# Patient Record
Sex: MALE | NOT HISPANIC OR LATINO | Employment: UNEMPLOYED | URBAN - METROPOLITAN AREA
[De-identification: names, ages, dates, MRNs, and addresses within clinical notes are randomized per-mention and may not be internally consistent; named-entity substitution may affect disease eponyms.]

---

## 2017-01-12 ENCOUNTER — OFFICE VISIT (OUTPATIENT)
Dept: INTERNAL MEDICINE | Facility: MEDICAL CENTER | Age: 27
End: 2017-01-12
Payer: MEDICAID

## 2017-01-12 VITALS
BODY MASS INDEX: 41.01 KG/M2 | OXYGEN SATURATION: 94 % | DIASTOLIC BLOOD PRESSURE: 85 MMHG | HEART RATE: 85 BPM | TEMPERATURE: 98.4 F | WEIGHT: 309.4 LBS | SYSTOLIC BLOOD PRESSURE: 134 MMHG | HEIGHT: 73 IN

## 2017-01-12 DIAGNOSIS — M54.50 CHRONIC MIDLINE LOW BACK PAIN WITHOUT SCIATICA: ICD-10-CM

## 2017-01-12 DIAGNOSIS — M72.2 PLANTAR FASCIITIS, BILATERAL: ICD-10-CM

## 2017-01-12 DIAGNOSIS — M79.671 RIGHT FOOT PAIN: ICD-10-CM

## 2017-01-12 DIAGNOSIS — G89.29 CHRONIC MIDLINE LOW BACK PAIN WITHOUT SCIATICA: ICD-10-CM

## 2017-01-12 DIAGNOSIS — E66.01 MORBID OBESITY WITH BMI OF 40.0-44.9, ADULT (HCC): ICD-10-CM

## 2017-01-12 DIAGNOSIS — Z87.898 HISTORY OF SNORING: ICD-10-CM

## 2017-01-12 DIAGNOSIS — Z00.00 PREVENTATIVE HEALTH CARE: ICD-10-CM

## 2017-01-12 DIAGNOSIS — E66.01 MORBID OBESITY DUE TO EXCESS CALORIES (HCC): ICD-10-CM

## 2017-01-12 PROCEDURE — 90471 IMMUNIZATION ADMIN: CPT | Mod: GC | Performed by: INTERNAL MEDICINE

## 2017-01-12 PROCEDURE — 90715 TDAP VACCINE 7 YRS/> IM: CPT | Mod: GC | Performed by: INTERNAL MEDICINE

## 2017-01-12 PROCEDURE — 99204 OFFICE O/P NEW MOD 45 MIN: CPT | Mod: 25,GC | Performed by: INTERNAL MEDICINE

## 2017-01-12 RX ORDER — ACETAMINOPHEN 500 MG
500-1000 TABLET ORAL
Qty: 90 TAB | Refills: 3 | Status: SHIPPED | OUTPATIENT
Start: 2017-01-12 | End: 2021-03-08

## 2017-01-12 ASSESSMENT — PATIENT HEALTH QUESTIONNAIRE - PHQ9: CLINICAL INTERPRETATION OF PHQ2 SCORE: 0

## 2017-01-12 NOTE — MR AVS SNAPSHOT
"        Anival Brooks   2017 2:45 PM   Office Visit   MRN: [id number]    Department:  [facility] - Internal Med   Dept Phone:  [phone]    Description:  Male : 1990   Provider:  Lisa Mancini M.D.           Reason for Visit     Establish Care     Ankle Pain Right       Allergies as of 2017     No Known Allergies      You were diagnosed with     Plantar fasciitis, bilateral   [398093]       Chronic midline low back pain without sciatica   [0873130]       Right foot pain   [766275]       History of snoring   [9092988]       Morbid obesity due to excess calories (HCC)   [0982646]       Preventative health care   [964521]         Vital Signs     Blood Pressure Pulse Temperature Height Weight Body Mass Index    134/85 mmHg 85 36.9 °C (98.4 °F) 1.842 m (6' 0.52\") 140.343 kg (309 lb 6.4 oz) 41.36 kg/m2    Oxygen Saturation Smoking Status                94% Never Smoker           Basic Information     Date Of Birth Sex Race Ethnicity Preferred Language    1990 Male White Non- English      Your appointments     Mar 22, 2017 10:00 AM   Established Patient with Lisa Mancini M.D.   [facility] - Internal Medicine (--)    [address] [place] [address] [id number] [phone]           You will be receiving a confirmation call a few days before your appointment from our automated call confirmation system.              Problem List              ICD-10-CM Priority Class Noted - Resolved    Plantar fasciitis, bilateral M72.2   2017 - Present    Chronic midline low back pain without sciatica M54.5, G89.29   2017 - Present    Right foot pain M79.671   2017 - Present    Preventative health care Z00.00   2017 - Present    History of snoring Z87.898   2017 - Present    Morbid obesity due to excess calories (HCC) E66.01   2017 - Present      Health Maintenance     Patient has no pending health maintenance at this time      Current " Immunizations     Dtap Vaccine 8/26/1993, 8/20/1990, 4/24/1989, 2/10/1989, 1990    Hepatitis A Vaccine, Ped/Adol 8/10/2005, 7/28/2004    Hepatitis B Vaccine Non-Recombivax (Ped/Adol) 3/15/2004, 4/28/2003, 8/26/1993    IPV 3/15/2004, 8/26/1993, 1990    MMR Vaccine 8/26/1993, 8/20/1990    TD Vaccine 4/28/2003    Tdap Vaccine 1/12/2017      Below and/or attached are the medications your provider expects you to take. Review all of your home medications and newly ordered medications with your provider and/or pharmacist. Follow medication instructions as directed by your provider and/or pharmacist. Please keep your medication list with you and share with your provider. Update the information when medications are discontinued, doses are changed, or new medications (including over-the-counter products) are added; and carry medication information at all times in the event of emergency situations     Allergies:  No Known Allergies          Medications  Valid as of: January 12, 2017 -  3:54 PM    Generic Name Brand Name Tablet Size Instructions for use    Acetaminophen (Tab) TYLENOL 500 MG Take 1-2 Tabs by mouth 1 time daily as needed for Mild Pain or Moderate Pain.        .                 Medicines prescribed today were sent to:     [facility] [address] [place] [id number]    Phone: [phone] Fax: [phone]    Open 24 Hours?: No      Medication refill instructions:       If your prescription bottle indicates you have medication refills left, it is not necessary to call your provider’s office. Please contact your pharmacy and they will refill your medication.    If your prescription bottle indicates you do not have any refills left, you may request refills at any time through one of the following ways: The online [employer] system (except Urgent Care), by calling your provider’s office, or by asking your pharmacy to contact your provider’s office with  a refill request. Medication refills are processed only during regular business hours and may not be available until the next business day. Your provider may request additional information or to have a follow-up visit with you prior to refilling your medication.   *Please Note: Medication refills are assigned a new Rx number when refilled electronically. Your pharmacy may indicate that no refills were authorized even though a new prescription for the same medication is available at the pharmacy. Please request the medicine by name with the pharmacy before contacting your provider for a refill.        Referral     A referral request has been sent to our patient care coordination department. Please allow 3-5 business days for us to process this request and contact you either by phone or mail. If you do not hear from us by the 5th business day, please call us at [phone].        Instructions    - return for f/u in 2 months with labs  - use MyFitnessPal for calorie counting  - call [phone] to schedule sleep study      Back Exercises  Back exercises help treat and prevent back injuries. The goal of back exercises is to increase the strength of your abdominal and back muscles and the flexibility of your back. These exercises should be started when you no longer have back pain. Back exercises include:  · Pelvic Tilt. Lie on your back with your knees bent. Tilt your pelvis until the lower part of your back is against the floor. Hold this position 5 to 10 sec and repeat 5 to 10 times.  · Knee to Chest. Pull first 1 knee up against your chest and hold for 20 to 30 seconds, repeat this with the other knee, and then both knees. This may be done with the other leg straight or bent, whichever feels better.  · Sit-Ups or Curl-Ups. Bend your knees 90 degrees. Start with tilting your pelvis, and do a partial, slow sit-up, lifting your trunk only 30 to 45 degrees off the floor. Take at least 2 to 3 seconds for each sit-up. Do  not do sit-ups with your knees out straight. If partial sit-ups are difficult, simply do the above but with only tightening your abdominal muscles and holding it as directed.  · Hip-Lift. Lie on your back with your knees flexed 90 degrees. Push down with your feet and shoulders as you raise your hips a couple inches off the floor; hold for 10 seconds, repeat 5 to 10 times.  · Back arches. Lie on your stomach, propping yourself up on bent elbows. Slowly press on your hands, causing an arch in your low back. Repeat 3 to 5 times. Any initial stiffness and discomfort should lessen with repetition over time.  · Shoulder-Lifts. Lie face down with arms beside your body. Keep hips and torso pressed to floor as you slowly lift your head and shoulders off the floor.  Do not overdo your exercises, especially in the beginning. Exercises may cause you some mild back discomfort which lasts for a few minutes; however, if the pain is more severe, or lasts for more than 15 minutes, do not continue exercises until you see your caregiver. Improvement with exercise therapy for back problems is slow.   See your caregivers for assistance with developing a proper back exercise program.     This information is not intended to replace advice given to you by your health care provider. Make sure you discuss any questions you have with your health care provider.     Document Released: 01/25/2006 Document Revised: 03/11/2013 Document Reviewed: 02/11/2016  [employer] Interactive Patient Education ©2016 [employer]            LP Amina Access Code: [email or web address]  Expires: 2/11/2017  1:50 PM    LP Amina  A secure, online tool to manage your health information     [employer] Amina® is a secure, online tool that connects you to your personalized health information from the privacy of your home -- day or night - making it very easy for you to manage your healthcare. Once the activation process is completed, you can even access your medical  information using the [employer] segundo, which is available for free in the Apple Segundo store or Google Play store.     [employer] provides the following levels of access (as shown below):   My Chart Features   [facility] Primary [facility] Doctor [facility]  Specialists [facility]  Urgent  Care Non-[facility]  Primary Care  Doctor   Email your healthcare team securely and privately 24/7 X X X    Manage appointments: schedule your next appointment; view details of past/upcoming appointments X      Request prescription refills. X      View recent personal medical records, including lab and immunizations X X X X   View health record, including health history, allergies, medications X X X X   Read reports about your outpatient visits, procedures, consult and ER notes X X X X   See your discharge summary, which is a recap of your hospital and/or ER visit that includes your diagnosis, lab results, and care plan. X X       How to register for [employer]:  1. Go to  [email or web address].  2. Click on the Sign Up Now box, which takes you to the New Member Sign Up page. You will need to provide the following information:  a. Enter your [employer] Access Code exactly as it appears at the top of this page. (You will not need to use this code after you’ve completed the sign-up process. If you do not sign up before the expiration date, you must request a new code.)   b. Enter your date of birth.   c. Enter your home email address.   d. Click Submit, and follow the next screen’s instructions.  3. Create a [employer] ID. This will be your [employer] login ID and cannot be changed, so think of one that is secure and easy to remember.  4. Create a [employer] password. You can change your password at any time.  5. Enter your Password Reset Question and Answer. This can be used at a later time if you forget your password.   6. Enter your e-mail address. This allows you to receive e-mail notifications when new information is available in [employer].  7. Click Sign Up. You can now  view your health information.    For assistance activating your [employer] account, call [phone]

## 2017-01-12 NOTE — PROGRESS NOTES
New Patient to Establish    Reason to establish: New patient to establish    CC: right foot pain    HPI:   Mr. Brooks is a 25 y/o male with no PMHx who presents today to establish a PCP.    He reports 3 month history of right ankle pain mostly on specific movements, such as when he is golfing. He denies weakness or radiation of pain.  Pain is significant enough when it occurs that he is no longer able to walk the course.    He has some minimal back stiffness/pain in the mornings for the past 2 years for which he has been taking Aleve every other morning.  Pain goes away once he starts moving around in the morning.  He denies bowel/bladder incontinence, loss of sensation, sciatica, or weakness.     Patient states he is heavier now than he usually is in summer when he golfs regularly and goes down about 20-30 lbs at that time.  He has a poor diet as well.  He does endorse snoring at night and some daytime fatigue, and his father has sleep apnea and is on CPAP x 10 years.  His wife does not note that he stops breathing at night.  He does have a family h/o DM.    He has a h/o plantar fasciitis that recurs infrequently and patient does stretches at home to prevent symptoms.    Last tetanus was in childhood.  He has not had flu shot this year.    Patient Active Problem List    Diagnosis Date Noted   • Plantar fasciitis, bilateral 01/12/2017   • Chronic midline low back pain without sciatica 01/12/2017   • Right foot pain 01/12/2017   • Preventative health care 01/12/2017   • History of snoring 01/12/2017   • Morbid obesity due to excess calories (HCC) 01/12/2017       History reviewed. No pertinent past medical history.    Current Outpatient Prescriptions   Medication Sig Dispense Refill   • acetaminophen (TYLENOL) 500 MG Tab Take 1-2 Tabs by mouth 1 time daily as needed for Mild Pain or Moderate Pain. 90 Tab 3     No current facility-administered medications for this visit.       Allergies as of 01/12/2017   • (No Known  "Allergies)       Social History     Social History   • Marital Status:      Spouse Name: N/A   • Number of Children: N/A   • Years of Education: N/A     Occupational History   • Not on file.     Social History Main Topics   • Smoking status: Never Smoker    • Smokeless tobacco: Never Used   • Alcohol Use: No   • Drug Use: No   • Sexual Activity:     Partners: Female     Other Topics Concern   • Not on file     Social History Narrative       Family History   Problem Relation Age of Onset   • Diabetes Father    • Hypertension Father    • Diabetes Sister    • Stroke Neg Hx    • Heart Disease Neg Hx    • Cancer Neg Hx    • Lung Disease Neg Hx        History reviewed. No pertinent past surgical history.    ROS: As per HPI. Additional pertinent symptoms as noted below.  Constitutional: no fevers, chills, weight change  Eyes: no blurred vision, discharge, eye pain  ENT: no rhinorrhea, sore throat, neck masses  Cardiovascular: no angina, palpitations, PND, orthopnea, edema  Respiratory: no cough, sputum, or dyspnea  GI: no nausea, vomiting, abdominal pain, constipation, or diarrhea  : no dysuria, hematuria, frequency   Musculo-skeletal: chronic low back pain; right foot pain  Skin: no rashes or open wounds  Neurological: no headaches, dizziness, motor/sensory loss  Psychological: no anxiety or depression  All others negative    /85 mmHg  Pulse 85  Temp(Src) 36.9 °C (98.4 °F)  Ht 1.842 m (6' 0.52\")  Wt 140.343 kg (309 lb 6.4 oz)  BMI 41.36 kg/m2  SpO2 94%  BMI 41    Physical Exam  GEN: patient is in no acute distress;  Morbidly obese   HEAD: normocephalic, atraumatic  EYES: PERRL, EOMI  THROAT: moist oral mucosa, no pharyngeal exudates or erythema  NECK: supple, no lymphadenopathy, no thyroid enlargement   CVS: regular rate and rhythm, no murmur/rubs/gallops  LUNGS: CTABL, no rales/ronchi   ABD: soft, nontender, no guarding/rebound/rigidity, no organomegaly, BS(+)   NEURO: A&Ox4, no focal neurological " deficits   EXT: no pedal edema, clubbing, or cyanosis   SKIN: skin intact, no suspicious rashes or lesions   MSK: no paravertebral tenderness, + mild vertebral tenderness at L3 level, full ROM, b/l SLR negative   PSYCH: mood and affect normal       Assessment and Plan    Chronic midline low back pain without sciatica  - no red flag symptoms; mild symptoms  - advised not to use Aleve anymore  - tylenol prn for pain control  - daily exercise and weight loss encouraged  - return for f/u in 2 months    Right foot pain  - appears to be tendonitis or bursitis due to repetitive movements  - ankle brace ordered for use while golfing  - return for f/u in 2 months    History of snoring  - father has BALDEMAR; patient reports some daytime fatigue  - sleep study ordered as pt has STOP-BANG score of 4-5  - return for f/u in 2 months    Morbid obesity due to excess calories (HCC)  - BMI 41 today (1/2017)  - counseled extensively on importance of diet, exercise and weight loss  - will check TSH, A1c and lipid panel  - sleep study ordered  - return for f/u in 2 months    Plantar fasciitis, bilateral  - stable with daily exercises    Preventative health care  - patient to get flu shot from pharmacy  - Tdap  given today (1/2017)  - CBC/CMP ordered to be done prior to f/u in 1 month      Signed by: Lisa Mancini M.D.

## 2017-01-12 NOTE — PATIENT INSTRUCTIONS
- return for f/u in 2 months with labs  - use Snaps for calorie counting  - call [phone] to schedule sleep study      Back Exercises  Back exercises help treat and prevent back injuries. The goal of back exercises is to increase the strength of your abdominal and back muscles and the flexibility of your back. These exercises should be started when you no longer have back pain. Back exercises include:  · Pelvic Tilt. Lie on your back with your knees bent. Tilt your pelvis until the lower part of your back is against the floor. Hold this position 5 to 10 sec and repeat 5 to 10 times.  · Knee to Chest. Pull first 1 knee up against your chest and hold for 20 to 30 seconds, repeat this with the other knee, and then both knees. This may be done with the other leg straight or bent, whichever feels better.  · Sit-Ups or Curl-Ups. Bend your knees 90 degrees. Start with tilting your pelvis, and do a partial, slow sit-up, lifting your trunk only 30 to 45 degrees off the floor. Take at least 2 to 3 seconds for each sit-up. Do not do sit-ups with your knees out straight. If partial sit-ups are difficult, simply do the above but with only tightening your abdominal muscles and holding it as directed.  · Hip-Lift. Lie on your back with your knees flexed 90 degrees. Push down with your feet and shoulders as you raise your hips a couple inches off the floor; hold for 10 seconds, repeat 5 to 10 times.  · Back arches. Lie on your stomach, propping yourself up on bent elbows. Slowly press on your hands, causing an arch in your low back. Repeat 3 to 5 times. Any initial stiffness and discomfort should lessen with repetition over time.  · Shoulder-Lifts. Lie face down with arms beside your body. Keep hips and torso pressed to floor as you slowly lift your head and shoulders off the floor.  Do not overdo your exercises, especially in the beginning. Exercises may cause you some mild back discomfort which lasts for a few minutes;  however, if the pain is more severe, or lasts for more than 15 minutes, do not continue exercises until you see your caregiver. Improvement with exercise therapy for back problems is slow.   See your caregivers for assistance with developing a proper back exercise program.     This information is not intended to replace advice given to you by your health care provider. Make sure you discuss any questions you have with your health care provider.     Document Released: 01/25/2006 Document Revised: 03/11/2013 Document Reviewed: 02/11/2016  Elsevier Interactive Patient Education ©2016 [employer] Inc.

## 2017-01-13 PROBLEM — E66.01 MORBID OBESITY WITH BMI OF 40.0-44.9, ADULT (HCC): Status: ACTIVE | Noted: 2017-01-13

## 2017-03-22 ENCOUNTER — APPOINTMENT (OUTPATIENT)
Dept: INTERNAL MEDICINE | Facility: MEDICAL CENTER | Age: 27
End: 2017-03-22
Payer: MEDICAID

## 2020-09-03 ENCOUNTER — TELEPHONE (OUTPATIENT)
Dept: SCHEDULING | Facility: IMAGING CENTER | Age: 30
End: 2020-09-03

## 2020-09-08 ENCOUNTER — OFFICE VISIT (OUTPATIENT)
Dept: MEDICAL GROUP | Facility: IMAGING CENTER | Age: 30
End: 2020-09-08
Payer: COMMERCIAL

## 2020-09-08 VITALS
DIASTOLIC BLOOD PRESSURE: 80 MMHG | TEMPERATURE: 98.9 F | HEIGHT: 74 IN | OXYGEN SATURATION: 95 % | RESPIRATION RATE: 16 BRPM | SYSTOLIC BLOOD PRESSURE: 122 MMHG | BODY MASS INDEX: 39.25 KG/M2 | WEIGHT: 305.8 LBS | HEART RATE: 65 BPM

## 2020-09-08 DIAGNOSIS — H91.91 HEARING LOSS OF RIGHT EAR, UNSPECIFIED HEARING LOSS TYPE: ICD-10-CM

## 2020-09-08 DIAGNOSIS — H93.8X1 PRESSURE SENSATION IN RIGHT EAR: ICD-10-CM

## 2020-09-08 PROCEDURE — 99203 OFFICE O/P NEW LOW 30 MIN: CPT | Performed by: FAMILY MEDICINE

## 2020-09-08 ASSESSMENT — ENCOUNTER SYMPTOMS
PALPITATIONS: 0
SHORTNESS OF BREATH: 0
NAUSEA: 0
CHILLS: 0
MYALGIAS: 0
HEADACHES: 0
DIARRHEA: 0
COUGH: 0
WHEEZING: 0
ABDOMINAL PAIN: 0
DIZZINESS: 0
EYE PAIN: 0
VOMITING: 0
FEVER: 0
DOUBLE VISION: 0

## 2020-09-08 ASSESSMENT — PATIENT HEALTH QUESTIONNAIRE - PHQ9: CLINICAL INTERPRETATION OF PHQ2 SCORE: 0

## 2020-09-08 NOTE — PROGRESS NOTES
Chief Complaint   Patient presents with   • Establish Care   • Hearing Problem     right ear, x 4 days    • Urinary Pain     burning, x couple weeks      HPI:  29 year old with burning following with urology. Right ear pain and loss of hearing 4 days. Denies Tinnitis. Has had tubes in his ears as a kid.   Has seen urology for the burning during urination and had negative urine testing as well as negative nabil/chlam testing. No fevers, chills, n/v, or flank pain. Given antibiotics for two weeks just finished. Reports excellent access to urologist.   No Known Allergies  Current Outpatient Medications   Medication Sig Dispense Refill   • acetaminophen (TYLENOL) 500 MG Tab Take 1-2 Tabs by mouth 1 time daily as needed for Mild Pain or Moderate Pain. 90 Tab 3     No current facility-administered medications for this visit.      Social History     Tobacco Use   • Smoking status: Never Smoker   • Smokeless tobacco: Never Used   Substance Use Topics   • Alcohol use: No   • Drug use: No     Family History   Problem Relation Age of Onset   • Diabetes Father    • Hypertension Father    • Diabetes Sister    • Stroke Neg Hx    • Heart Disease Neg Hx    • Cancer Neg Hx    • Lung Disease Neg Hx        There were no vitals taken for this visit. There is no height or weight on file to calculate BMI.  Review of Systems   Constitutional: Negative for chills, fever and malaise/fatigue.   HENT: Positive for hearing loss. Negative for ear discharge, ear pain and tinnitus.    Eyes: Negative for double vision and pain.   Respiratory: Negative for cough, shortness of breath and wheezing.    Cardiovascular: Negative for chest pain, palpitations and leg swelling.   Gastrointestinal: Negative for abdominal pain, diarrhea, nausea and vomiting.   Genitourinary: Positive for dysuria. Negative for frequency.   Musculoskeletal: Negative for joint pain and myalgias.   Skin: Negative for itching and rash.   Neurological: Negative for dizziness and  headaches.     Physical Exam   Constitutional: He is well-developed, well-nourished, and in no distress. No distress.   HENT:   Head: Normocephalic and atraumatic.   Right tm traumatic with scaring and difficult to examine. Right canal clear. Left canal with some cerumen obstructing the tm.    Eyes: Pupils are equal, round, and reactive to light. Conjunctivae and EOM are normal. Right eye exhibits no discharge. Left eye exhibits no discharge. No scleral icterus.   Neck: No thyromegaly present.   Pulmonary/Chest: Effort normal. No respiratory distress.   Abdominal: He exhibits no distension.   Musculoskeletal:         General: No edema.   Neurological: He is alert.   Skin: Skin is warm and dry. He is not diaphoretic.   Psychiatric: Affect and judgment normal.       All labs (last 1 month):   No visits with results within 1 Month(s) from this visit.   Latest known visit with results is:   Admission on 12/27/2012, Discharged on 12/27/2012   Component Date Value Ref Range Status   • Carbon Monoxide-Co 12/27/2012 12.90* 0.00 - 4.90 % Final       Lipids: No results found for: CHOLSTRLTOT, TRIGLYCERIDE, HDL, LDL    Imaging: No results found.    A/P    Return for annual.    Problem List Items Addressed This Visit     None      Visit Diagnoses     Hearing loss of right ear, unspecified hearing loss type        Relevant Orders    REFERRAL TO ENT    Pressure sensation in right ear        Relevant Orders    REFERRAL TO ENT          Portions of this note may be dictated using Dragon NaturallySpeaking voice recognition software.  Variances in spelling and vocabulary are possible and unintentional.  Not all areas may be caught/corrected.  Please notify me if any discrepancies are noted or if the meaning of any statement is not correct/clear.

## 2021-03-08 ENCOUNTER — HOSPITAL ENCOUNTER (OUTPATIENT)
Facility: MEDICAL CENTER | Age: 31
End: 2021-03-08
Attending: PHYSICIAN ASSISTANT
Payer: COMMERCIAL

## 2021-03-08 ENCOUNTER — OFFICE VISIT (OUTPATIENT)
Dept: URGENT CARE | Facility: PHYSICIAN GROUP | Age: 31
End: 2021-03-08
Payer: COMMERCIAL

## 2021-03-08 VITALS
SYSTOLIC BLOOD PRESSURE: 130 MMHG | RESPIRATION RATE: 16 BRPM | OXYGEN SATURATION: 96 % | DIASTOLIC BLOOD PRESSURE: 72 MMHG | WEIGHT: 310.4 LBS | HEIGHT: 74 IN | BODY MASS INDEX: 39.83 KG/M2 | TEMPERATURE: 98.3 F | HEART RATE: 91 BPM

## 2021-03-08 DIAGNOSIS — H66.003 NON-RECURRENT ACUTE SUPPURATIVE OTITIS MEDIA OF BOTH EARS WITHOUT SPONTANEOUS RUPTURE OF TYMPANIC MEMBRANES: ICD-10-CM

## 2021-03-08 DIAGNOSIS — H92.11 OTORRHEA OF RIGHT EAR: ICD-10-CM

## 2021-03-08 DIAGNOSIS — J06.9 VIRAL URI WITH COUGH: ICD-10-CM

## 2021-03-08 DIAGNOSIS — H71.91 CHOLESTEATOMA OF RIGHT EAR: ICD-10-CM

## 2021-03-08 PROCEDURE — U0005 INFEC AGEN DETEC AMPLI PROBE: HCPCS

## 2021-03-08 PROCEDURE — 99214 OFFICE O/P EST MOD 30 MIN: CPT | Performed by: PHYSICIAN ASSISTANT

## 2021-03-08 PROCEDURE — U0003 INFECTIOUS AGENT DETECTION BY NUCLEIC ACID (DNA OR RNA); SEVERE ACUTE RESPIRATORY SYNDROME CORONAVIRUS 2 (SARS-COV-2) (CORONAVIRUS DISEASE [COVID-19]), AMPLIFIED PROBE TECHNIQUE, MAKING USE OF HIGH THROUGHPUT TECHNOLOGIES AS DESCRIBED BY CMS-2020-01-R: HCPCS

## 2021-03-08 RX ORDER — AZITHROMYCIN 500 MG/1
TABLET, FILM COATED ORAL
COMMUNITY
End: 2021-03-08

## 2021-03-08 RX ORDER — AMOXICILLIN AND CLAVULANATE POTASSIUM 875; 125 MG/1; MG/1
1 TABLET, FILM COATED ORAL 2 TIMES DAILY
Qty: 14 TABLET | Refills: 0 | Status: SHIPPED | OUTPATIENT
Start: 2021-03-08 | End: 2021-03-15

## 2021-03-08 RX ORDER — DOXYCYCLINE HYCLATE 100 MG/1
CAPSULE ORAL
COMMUNITY
End: 2021-03-08

## 2021-03-08 RX ORDER — FLUCONAZOLE 150 MG/1
TABLET ORAL
COMMUNITY
End: 2021-03-08

## 2021-03-08 RX ORDER — OFLOXACIN 3 MG/ML
2 SOLUTION AURICULAR (OTIC) 2 TIMES DAILY
Qty: 20 ML | Refills: 0 | Status: SHIPPED | OUTPATIENT
Start: 2021-03-08 | End: 2021-03-22

## 2021-03-08 RX ORDER — METRONIDAZOLE 500 MG/1
TABLET ORAL
COMMUNITY
End: 2021-03-08

## 2021-03-08 ASSESSMENT — ENCOUNTER SYMPTOMS
DIARRHEA: 0
SORE THROAT: 1
WHEEZING: 0
ABDOMINAL PAIN: 0
VOMITING: 0
SHORTNESS OF BREATH: 0
COUGH: 1
NAUSEA: 0
FEVER: 0
CHILLS: 0

## 2021-03-08 NOTE — PROGRESS NOTES
Subjective:   Anival Brooks is a 30 y.o. male who presents for Cough (R earache, congestion, x1 day )      HPI  Patient is a 30-year-old male who presents with URI symptoms onset 4 to 5 days ago as well as development of right ear pain 2 days ago.  He has noticed discharge from his right ear as well.  He is mostly concerned about his right ear pain.  He has associated decreased hearing to his right ear.  He has had associated nasal congestion.  He is requesting COVID-19 test.  Denies any fever, chills, sore throat, chest pain, shortness of breath, abdominal pain, nausea, vomiting, diarrhea.  Patient reports a significant history of recurrent otitis media as a child.      Review of Systems   Constitutional: Negative for chills and fever.   HENT: Positive for congestion, ear discharge, ear pain and sore throat.         Decreasing hearing to right ear   Respiratory: Positive for cough. Negative for shortness of breath and wheezing.    Cardiovascular: Negative for chest pain.   Gastrointestinal: Negative for abdominal pain, diarrhea, nausea and vomiting.   Skin: Negative.        Medications:    • acetaminophen Tabs  • azithromycin  • doxycycline Caps  • fluconazole  • metroNIDAZOLE Tabs    Allergies: Patient has no known allergies.    Problem List: Anival Brooks has Plantar fasciitis, bilateral; Chronic midline low back pain without sciatica; Right foot pain; Preventative health care; History of snoring; Morbid obesity due to excess calories (HCC); and Morbid obesity with BMI of 40.0-44.9, adult (HCC) on their problem list.    Surgical History:  No past surgical history on file.    Past Social Hx: Anival Brooks  reports that he has never smoked. He has never used smokeless tobacco. He reports that he does not drink alcohol and does not use drugs.     Past Family Hx:  Anival Brooks family history includes Diabetes in his father and sister; Hypertension in his father.     Problem list, medications, and allergies reviewed by  "myself today in Epic.     Objective:     /72 (BP Location: Right arm, Patient Position: Sitting, BP Cuff Size: Adult)   Pulse 91   Temp 36.8 °C (98.3 °F) (Temporal)   Resp 16   Ht 1.88 m (6' 2\")   Wt (!) 141 kg (310 lb 6.4 oz)   SpO2 96%   BMI 39.85 kg/m²     Physical Exam  Vitals reviewed.   Constitutional:       General: He is not in acute distress.     Appearance: Normal appearance. He is not ill-appearing or toxic-appearing.   HENT:      Right Ear: Decreased hearing noted. Drainage present. No mastoid tenderness.      Left Ear: No mastoid tenderness. Tympanic membrane is erythematous and bulging.      Ears:      Comments: Right ear: granulation tissue, erythematous canal without edema. Small amount of bubble drainage to site concerning for possible cholesteatoma. No visualization of the TM, however, TM visualized by Dr. Martinez (MD) that is erythematous without perforation.      Mouth/Throat:      Mouth: Mucous membranes are moist.      Pharynx: Oropharynx is clear. No oropharyngeal exudate or posterior oropharyngeal erythema.   Eyes:      Conjunctiva/sclera: Conjunctivae normal.      Pupils: Pupils are equal, round, and reactive to light.   Cardiovascular:      Rate and Rhythm: Normal rate and regular rhythm.      Heart sounds: Normal heart sounds.   Pulmonary:      Effort: Pulmonary effort is normal. No respiratory distress.      Breath sounds: Normal breath sounds. No wheezing, rhonchi or rales.   Musculoskeletal:      Cervical back: Neck supple.   Lymphadenopathy:      Cervical: No cervical adenopathy.   Skin:     General: Skin is warm and dry.   Neurological:      General: No focal deficit present.      Mental Status: He is alert and oriented to person, place, and time.   Psychiatric:         Mood and Affect: Mood normal.         Behavior: Behavior normal.         Assessment/Associated Orders     1. Viral URI with cough  SARS-CoV-2 PCR (24 hour In-House): Collect NP swab in [facility]   2. " Cholesteatoma of right ear  ofloxacin otic sol (FLOXIN OTIC) 0.3 % Solution    REFERRAL TO ENT   3. Non-recurrent acute suppurative otitis media of both ears without spontaneous rupture of tympanic membranes  amoxicillin-clavulanate (AUGMENTIN) 875-125 MG Tab    REFERRAL TO ENT   4. Otorrhea of right ear         Medical Decision Making      This is a well-appearing 30-year-old male with recent resolving URI symptoms for the past 5 days with the development of right ear pain and otorrhea x2 days.  Exam shows possible concerns of cholesteatoma.  I personally did not visualize TM, however the other provider here in the clinic visualized TM that is erythematous without perforation.  There is some small amount of otorrhea within the canal as well.  On top of this, he has a left otitis media.  There is no mastoid tenderness bilaterally.  No other bony tenderness or palpation tenderness around the ear.     We will place on ofloxacin eardrops for the right ear.  Will cover for otitis media with Augmentin.  Avoid water to the right ear.   Referral to ENT placed for further evaluation and treatment.     Test for COVID-19. Result will be reviewed by myself. We will call/message back for results and appropriate further instructions. Instructed to sign up for [facility] if they have not already. Result will be automatically released to [facility] application for patient review. I will be sending a message with Next Step Instructions to [facility] soon after resulted.   Symptomatic and supportive care:   Plenty of oral hydration and rest   Over the counter cough suppressant as directed.  Tylenol or ibuprofen for pain and fever as directed.   Saline nasal spray and Flonase as a decongestant.   Infection control measures at home. Stay away from people, Hand washing, covering sneeze/cough, disinfect surfaces.   Remain home from work, school, and other populated environments. Work note provided with information of quarantine measures per CDC  guidelines.   Overall, the patient is well-appearing. They are not hypoxic, afebrile, and a normal pulmonary exam.    I personally reviewed prior external notes and test results pertinent to today's visit.   Discussed management options, risks and benefits, and alternatives to treatment plan agreed upon.   Red flags discussed and indications to immediately call 911 or present to the Emergency Department.   Supportive care, differential diagnoses, and indications for immediate follow-up discussed with patient.    Patient expresses understanding and agrees to plan. Patient denies any other questions or concerns.     Advised the patient to follow-up with the primary care physician for recheck, reevaluation, and consideration of further management.    1 undiagnosed new problem with uncertain prognosis.  Discussed evaluation and management with another provider in the clinic.      Please note that this dictation was created using voice recognition software. I have made a reasonable attempt to correct obvious errors, but I expect that there are errors of grammar and possibly content that I did not discover before finalizing the note.    This note was electronically signed by Levi Gutierrez PA-C

## 2021-03-09 DIAGNOSIS — J06.9 VIRAL URI WITH COUGH: ICD-10-CM

## 2021-03-09 LAB
COVID ORDER STATUS COVID19: NORMAL
SARS-COV-2 RNA RESP QL NAA+PROBE: NOTDETECTED
SPECIMEN SOURCE: NORMAL

## 2024-12-11 ENCOUNTER — OFFICE VISIT (OUTPATIENT)
Dept: URGENT CARE | Facility: PHYSICIAN GROUP | Age: 34
End: 2024-12-11
Payer: COMMERCIAL

## 2024-12-11 ENCOUNTER — HOSPITAL ENCOUNTER (OUTPATIENT)
Facility: MEDICAL CENTER | Age: 34
End: 2024-12-11
Attending: STUDENT IN AN ORGANIZED HEALTH CARE EDUCATION/TRAINING PROGRAM
Payer: COMMERCIAL

## 2024-12-11 VITALS
OXYGEN SATURATION: 99 % | SYSTOLIC BLOOD PRESSURE: 138 MMHG | HEIGHT: 74 IN | TEMPERATURE: 98.4 F | DIASTOLIC BLOOD PRESSURE: 70 MMHG | HEART RATE: 82 BPM | RESPIRATION RATE: 18 BRPM | WEIGHT: 300 LBS | BODY MASS INDEX: 38.5 KG/M2

## 2024-12-11 DIAGNOSIS — N30.00 ACUTE CYSTITIS WITHOUT HEMATURIA: ICD-10-CM

## 2024-12-11 DIAGNOSIS — R30.0 DYSURIA: ICD-10-CM

## 2024-12-11 DIAGNOSIS — A42.9 ACTINOMYCES INFECTION: ICD-10-CM

## 2024-12-11 DIAGNOSIS — R36.9 PENILE DISCHARGE: ICD-10-CM

## 2024-12-11 LAB
APPEARANCE UR: NORMAL
BILIRUB UR STRIP-MCNC: NORMAL MG/DL
C TRACH DNA SPEC QL NAA+PROBE: NEGATIVE
COLOR UR AUTO: NORMAL
GLUCOSE UR STRIP.AUTO-MCNC: NORMAL MG/DL
KETONES UR STRIP.AUTO-MCNC: NORMAL MG/DL
LEUKOCYTE ESTERASE UR QL STRIP.AUTO: NORMAL
N GONORRHOEA DNA SPEC QL NAA+PROBE: NEGATIVE
NITRITE UR QL STRIP.AUTO: POSITIVE
PH UR STRIP.AUTO: 6.5 [PH] (ref 5–8)
PROT UR QL STRIP: NORMAL MG/DL
RBC UR QL AUTO: NORMAL
SP GR UR STRIP.AUTO: 1.02
SPECIMEN SOURCE: NORMAL
UROBILINOGEN UR STRIP-MCNC: 0.2 MG/DL

## 2024-12-11 PROCEDURE — 87077 CULTURE AEROBIC IDENTIFY: CPT

## 2024-12-11 PROCEDURE — 87798 DETECT AGENT NOS DNA AMP: CPT

## 2024-12-11 PROCEDURE — 87563 M. GENITALIUM AMP PROBE: CPT

## 2024-12-11 PROCEDURE — 87491 CHLMYD TRACH DNA AMP PROBE: CPT

## 2024-12-11 PROCEDURE — 99204 OFFICE O/P NEW MOD 45 MIN: CPT | Performed by: STUDENT IN AN ORGANIZED HEALTH CARE EDUCATION/TRAINING PROGRAM

## 2024-12-11 PROCEDURE — 87591 N.GONORRHOEAE DNA AMP PROB: CPT

## 2024-12-11 PROCEDURE — 87086 URINE CULTURE/COLONY COUNT: CPT

## 2024-12-11 PROCEDURE — 81002 URINALYSIS NONAUTO W/O SCOPE: CPT | Performed by: STUDENT IN AN ORGANIZED HEALTH CARE EDUCATION/TRAINING PROGRAM

## 2024-12-11 RX ORDER — METRONIDAZOLE 500 MG/1
TABLET ORAL
COMMUNITY

## 2024-12-11 NOTE — PROGRESS NOTES
"Subjective:   Anival Brooks is a 34 y.o. male who presents for UTI (Burning x 3 months )      HPI:  34-year-old male presents to urgent care for 3 months of consistent dysuria.  Has had some intermittent penile discharge.  Patient explains that he has had a positive history of Ureaplasma and mycoplasma infections that his wife is all also had in the past.  States his wife recently was tested for chlamydia, gonorrhea, and other STDs and were all negative.  States he has been treated for these in the past.  Denies any history of chlamydia or gonorrhea.  Not having any fever, chills, nausea, vomiting, testicular pain, scrotal swelling, penile lesions.    Medications:    meloxicam  metroNIDAZOLE Tabs    Allergies: Patient has no known allergies.    Problem List: Anival Brooks does not have any pertinent problems on file.    Surgical History:  No past surgical history on file.    Past Social Hx: Anival Brooks  reports that he has never smoked. He has never used smokeless tobacco. He reports that he does not drink alcohol and does not use drugs.     Past Family Hx:  Anival Brooks family history includes Diabetes in his father and sister; Hypertension in his father.     Problem list, medications, and allergies reviewed by myself today in Epic.     Objective:     /70 (BP Location: Right arm, Patient Position: Sitting, BP Cuff Size: Adult)   Pulse 82   Temp 36.9 °C (98.4 °F) (Temporal)   Resp 18   Ht 1.88 m (6' 2\")   Wt (!) 136 kg (300 lb)   SpO2 99%   BMI 38.52 kg/m²     Physical Exam  Vitals reviewed.   Cardiovascular:      Rate and Rhythm: Normal rate.      Pulses: Normal pulses.   Pulmonary:      Effort: Pulmonary effort is normal.   Abdominal:      Tenderness: There is no abdominal tenderness. There is no right CVA tenderness or left CVA tenderness.   Genitourinary:     Comments: PT deferred  Neurological:      Mental Status: He is alert.         Lab Results/POC Test Results   Results for orders placed or performed " in visit on 12/11/24   POCT Urinalysis    Collection Time: 12/11/24  9:43 AM   Result Value Ref Range    POC Color DARK YELLOW Negative    POC Appearance SLIGHTLY CLOUDY Negative    POC Glucose NEG Negative mg/dL    POC Bilirubin NEG Negative mg/dL    POC Ketones NEG Negative mg/dL    POC Specific Gravity 1.020 <1.005 - >1.030    POC Blood NEG Negative    POC Urine PH 6.5 5.0 - 8.0    POC Protein NEG Negative mg/dL    POC Urobiligen 0.2 Negative (0.2) mg/dL    POC Nitrites POSITIVE Negative    POC Leukocyte Esterase TRACE Negative           Assessment/Plan:     Diagnosis and associated orders:     1. Dysuria  POCT Urinalysis    Chlamydia/GC, PCR (Urine)    URINE CULTURE(NEW)    UROGENITAL UREAPLASMA/MYCOPLASMA, PCR    cefdinir (OMNICEF) 300 MG Cap    phenazopyridine (PYRIDIUM) 200 MG Tab      2. Penile discharge           Comments/MDM:     Patient presents today with 3 months of dysuria.  History of Ureaplasma infection.  Never history of chlamydia or gonorrhea.  No UTI history.  Patient's wife with recent STD testing all negative.  Urinalysis today shows nitrite positive and trace leukocyte esterase.  Would be unusual for patient to have acute cystitis for this length of time as I would expect this to progress to pyelonephritis.  Urine culture conducted for further evaluation.  Chlamydia/gonorrhea testing and Ureaplasma/mycoplasma testing.  Patient is agreeable to this.  Patient be contacted with any positive result requires treatment.         Differential diagnosis, natural history, supportive care, and indications for immediate follow-up discussed.    Advised the patient to follow-up with the primary care physician for recheck, reevaluation, and consideration of further management.    Please note that this dictation was created using voice recognition software. I have made a reasonable attempt to correct obvious errors, but I expect that there are errors of grammar and possibly content that I did not discover  before finalizing the note.    Electronically signed by Jose C Tirado PA-C.

## 2024-12-12 ENCOUNTER — TELEPHONE (OUTPATIENT)
Dept: URGENT CARE | Facility: PHYSICIAN GROUP | Age: 34
End: 2024-12-12

## 2024-12-12 RX ORDER — CEFDINIR 300 MG/1
300 CAPSULE ORAL 2 TIMES DAILY
Qty: 14 CAPSULE | Refills: 0 | Status: SHIPPED | OUTPATIENT
Start: 2024-12-12 | End: 2024-12-18

## 2024-12-12 RX ORDER — PHENAZOPYRIDINE HYDROCHLORIDE 200 MG/1
200 TABLET, FILM COATED ORAL 3 TIMES DAILY PRN
Qty: 6 TABLET | Refills: 0 | Status: SHIPPED | OUTPATIENT
Start: 2024-12-12 | End: 2024-12-14

## 2024-12-12 NOTE — TELEPHONE ENCOUNTER
Phone Number Called: [phone] (home)       Call outcome: Spoke to patient regarding message below.    Message: Pt came into office requesting results for his labs. Pt said he saw them on mychart and is in px.Please Advise.

## 2024-12-12 NOTE — TELEPHONE ENCOUNTER
Patient came in upset that he hasn't received a call back or medication prescribed for his illness, patient would like a call back with results and I informed there are results pending and he said there is not. Can you please call patient when available :)

## 2024-12-13 LAB
BACTERIA UR CULT: ABNORMAL
BACTERIA UR CULT: ABNORMAL
SIGNIFICANT IND 70042: ABNORMAL
SITE SITE: ABNORMAL
SOURCE SOURCE: ABNORMAL

## 2024-12-15 LAB
M GENITALIUM DNA SPEC QL NAA+PROBE: NOT DETECTED
M HOMINIS DNA SPEC QL NAA+PROBE: NOT DETECTED
SPECIMEN SOURCE: NORMAL
U PARVUM DNA SPEC QL NAA+PROBE: NOT DETECTED
U UREALYTICUM DNA SPEC QL NAA+PROBE: NOT DETECTED

## 2024-12-18 RX ORDER — PENICILLIN V POTASSIUM 500 MG/1
500 TABLET, FILM COATED ORAL 4 TIMES DAILY
Qty: 120 TABLET | Refills: 0 | Status: SHIPPED | OUTPATIENT
Start: 2024-12-18 | End: 2025-01-17

## 2024-12-27 ENCOUNTER — OFFICE VISIT (OUTPATIENT)
Dept: UROLOGY | Facility: MEDICAL CENTER | Age: 34
End: 2024-12-27
Payer: COMMERCIAL

## 2024-12-27 DIAGNOSIS — Q64.33 CONGENITAL MEATAL STENOSIS: ICD-10-CM

## 2024-12-27 DIAGNOSIS — N41.9 PROSTATITIS, UNSPECIFIED PROSTATITIS TYPE: ICD-10-CM

## 2024-12-27 PROCEDURE — 99204 OFFICE O/P NEW MOD 45 MIN: CPT | Performed by: UROLOGY

## 2024-12-27 RX ORDER — TAMSULOSIN HYDROCHLORIDE 0.4 MG/1
0.4 CAPSULE ORAL
Qty: 30 CAPSULE | Refills: 11 | Status: SHIPPED | OUTPATIENT
Start: 2024-12-27

## 2024-12-27 NOTE — PROGRESS NOTES
Chief Complaint: urinary symptoms    The patient was referred by MULU Torrez, for evaluation of the above chief complaint    History of Present Illness:    Anival Brooks is a 34 y.o. male who presents today for urinary symptoms  - Reports 3 months of urinary symptoms  - Pain/dysuria with urination  - Significant straining with penile pain due to ?back pressure  - Reports history of epididymitis prior to the urinary symptoms with resolution with antibiotics  - Since that time with no hematuria   - Urine culture recently positive for skin organisms    Subjective    Family History   Problem Relation Age of Onset    Diabetes Father     Hypertension Father     Diabetes Sister     Stroke Neg Hx     Heart Disease Neg Hx     Cancer Neg Hx     Lung Disease Neg Hx      Social History     Socioeconomic History    Marital status:      Spouse name: Not on file    Number of children: Not on file    Years of education: Not on file    Highest education level: Not on file   Occupational History    Not on file   Tobacco Use    Smoking status: Never    Smokeless tobacco: Never   Vaping Use    Vaping status: Never Used   Substance and Sexual Activity    Alcohol use: No    Drug use: No    Sexual activity: Yes     Partners: Female   Other Topics Concern    Not on file   Social History Narrative    Not on file     Social Drivers of Health     Financial Resource Strain: Not on file   Food Insecurity: Not on file   Transportation Needs: Not on file   Physical Activity: Not on file   Stress: Not on file   Social Connections: Not on file   Intimate Partner Violence: Not on file   Housing Stability: Not on file     No past surgical history on file.  No past medical history on file.  Current Outpatient Medications   Medication Sig Dispense Refill    tamsulosin (FLOMAX) 0.4 MG capsule Take 1 Capsule by mouth 1/2 hour after breakfast. 30 Capsule 11    penicillin v potassium (VEETID) 500 MG Tab Take 1 Tablet by mouth 4 times a day for  30 days. 120 Tablet 0    metroNIDAZOLE (FLAGYL) 500 MG Tab Take 4 tablets every day by oral route for 1 day. (Patient not taking: Reported on 12/11/2024)      meloxicam (MOBIC) 15 MG tablet Take 1 tablet by mouth once daily for 30 days (Patient not taking: Reported on 12/11/2024) 30 Tablet 0     No current facility-administered medications for this visit.     No Known Allergies    Review of systems was conducted and was negative except for as explicitly listed in the History of Present Illness.       Objective   There were no vitals taken for this visit.  Physical Exam  Vitals reviewed.   Constitutional:       Appearance: He is obese.   HENT:      Head: Normocephalic.      Nose: Nose normal.      Mouth/Throat:      Pharynx: Oropharynx is clear.   Eyes:      Conjunctiva/sclera: Conjunctivae normal.   Cardiovascular:      Rate and Rhythm: Normal rate.      Pulses: Normal pulses.   Pulmonary:      Effort: Pulmonary effort is normal.   Abdominal:      Palpations: Abdomen is soft.   Genitourinary:     Comments: Meatal stenosis, nearly obliterated  Lichen sclerosis / skin changes on the distal glans surrounding the meatus  Musculoskeletal:         General: Normal range of motion.      Cervical back: Neck supple.   Skin:     General: Skin is warm.   Neurological:      Mental Status: He is alert. Mental status is at baseline.   Psychiatric:         Mood and Affect: Mood normal.         Lab/Radiology/Diagnostic Review:  POCT UA   Lab Results   Component Value Date/Time    POCCOLOR DARK YELLOW 12/11/2024 09:43 AM    POCAPPEAR SLIGHTLY CLOUDY 12/11/2024 09:43 AM    POCLEUKEST TRACE 12/11/2024 09:43 AM    POCNITRITE POSITIVE 12/11/2024 09:43 AM    POCUROBILIGE 0.2 12/11/2024 09:43 AM    POCPROTEIN NEG 12/11/2024 09:43 AM    POCURPH 6.5 12/11/2024 09:43 AM    POCBLOOD NEG 12/11/2024 09:43 AM    POCSPGRV 1.020 12/11/2024 09:43 AM    POCKETONES NEG 12/11/2024 09:43 AM    POCBILIRUBIN NEG 12/11/2024 09:43 AM    POCGLUCUA NEG  12/11/2024 09:43 AM      [facility]   Lab Results   Component Value Date/Time    SODIUM 140 05/10/2012 0209    POTASSIUM 3.9 05/10/2012 0209    CHLORIDE 108 05/10/2012 0209    CO2 24 05/10/2012 0209    GLUCOSE 99 05/10/2012 0209    BUN 8 05/10/2012 0209    CREATININE 0.92 05/10/2012 0209    CALCIUM 8.6 05/10/2012 0209     I have reviewed and independently examined the lab results.  My findings are given in the HPI or above with the associated tests.        Assessment & Plan    It was a pleasure speaking with Anival Brooks today.    Anival Brooks is a 34 y.o. male w/ meatal stenosis and lichen sclerosis, potentially inflammation after epididymitis in the recent past  - Recommend tamsulosin to ensure no upstream issues/ prostatitis that is contributing to his symptoms  - Discussed risks, benefits, alternatives, and side effects to this medication  - The patient likely is symptomatic from the meatal stenosis  - Will discuss with Dr. Borden re: potential surgery, dilation vs meatotomy  - May consider steroid cream in the interim, after discussion with Dr. Borden

## 2024-12-30 ENCOUNTER — TELEPHONE (OUTPATIENT)
Dept: UROLOGY | Facility: MEDICAL CENTER | Age: 34
End: 2024-12-30
Payer: COMMERCIAL

## 2024-12-31 ENCOUNTER — OFFICE VISIT (OUTPATIENT)
Dept: UROLOGY | Facility: MEDICAL CENTER | Age: 34
End: 2024-12-31
Payer: COMMERCIAL

## 2024-12-31 DIAGNOSIS — L90.0 LICHEN SCLEROSUS: ICD-10-CM

## 2024-12-31 DIAGNOSIS — N35.914 ANTERIOR URETHRAL STRICTURE: ICD-10-CM

## 2024-12-31 NOTE — H&P (VIEW-ONLY)
Chief Complaint: meatal stenosis with lower urinary tract symptoms    HPI: Anival Brooks is a 34 y.o. male referred by Dr. Alford for evaluation and treatment of painful urination, significant straining to void with a sensation of back-pressure in the penis, and prior epididymitis. He was found on exam to have severe meatal stenosis.     His health is otherwise good. He takes no medication or supplements. No surgical history.     He has had no prior treatment for the meatal stenosis.     Past Medical History:  No past medical history on file.    Past Surgical History:  No past surgical history on file.    Family History:  Family History   Problem Relation Age of Onset    Diabetes Father     Hypertension Father     Diabetes Sister     Stroke Neg Hx     Heart Disease Neg Hx     Cancer Neg Hx     Lung Disease Neg Hx        Social History:  Social History     Socioeconomic History    Marital status:      Spouse name: Not on file    Number of children: Not on file    Years of education: Not on file    Highest education level: Not on file   Occupational History    Not on file   Tobacco Use    Smoking status: Never    Smokeless tobacco: Never   Vaping Use    Vaping status: Never Used   Substance and Sexual Activity    Alcohol use: No    Drug use: No    Sexual activity: Yes     Partners: Female   Other Topics Concern    Not on file   Social History Narrative    Not on file     Social Drivers of Health     Financial Resource Strain: Not on file   Food Insecurity: Not on file   Transportation Needs: Not on file   Physical Activity: Not on file   Stress: Not on file   Social Connections: Not on file   Intimate Partner Violence: Not on file   Housing Stability: Not on file       Medications:  Current Outpatient Medications   Medication Sig Dispense Refill    tamsulosin (FLOMAX) 0.4 MG capsule Take 1 Capsule by mouth 1/2 hour after breakfast. 30 Capsule 11    betamethasone valerate (VALISONE) 0.1 % Ointment Apply 1 g topically  2 times a day. 45 g 0    penicillin v potassium (VEETID) 500 MG Tab Take 1 Tablet by mouth 4 times a day for 30 days. 120 Tablet 0    metroNIDAZOLE (FLAGYL) 500 MG Tab Take 4 tablets every day by oral route for 1 day. (Patient not taking: Reported on 12/11/2024)      meloxicam (MOBIC) 15 MG tablet Take 1 tablet by mouth once daily for 30 days (Patient not taking: Reported on 12/11/2024) 30 Tablet 0     No current facility-administered medications for this visit.       Allergies:  No Known Allergies    Review of Systems:  Constitutional: Negative for fever, chills and malaise/fatigue.   HENT: Negative for congestion.    Eyes: Negative for pain.   Respiratory: Negative for cough and shortness of breath.    Cardiovascular: Negative for leg swelling.   Gastrointestinal: Negative for nausea, vomiting, abdominal pain and diarrhea.   Genitourinary: Negative for dysuria and hematuria.   Skin: Negative for rash.   Neurological: Negative for dizziness, focal weakness and headaches.   Endo/Heme/Allergies: Does not bruise/bleed easily.   Psychiatric/Behavioral: Negative for depression.  The patient is not nervous/anxious.        Physical Exam:  There were no vitals filed for this visit.    GENERAL: well appearing, well nourished, NAD  RESP: respiratory effort normal  ABDOMEN: soft, nontender, nondistended, no masses or organomegaly  HERNIAS: no hernias found on exam  SKIN/LYMPH: normal coloration and turgor, no suspicious skin lesions noted  NEURO/PSYCH: alert, oriented, normal speech, no focal findings or movement disorder noted  EXTREMITIES: no pedal edema noted  GENITOURINARY:  penis is circumcised; there is evidence of lichen sclerosus with a circumference of about 1.5 cm surrounding the urethral meatus, which is severely stenotic. There is some palpable urethral fibrosis in the fossa navicularis, and potentially in the distal penile urethra as well. Scrotum is normal; bilateral testes free of masses or tenderness.    RECTAL: Exam Deferred    Data Review:    Labs:  POCT UA   Lab Results   Component Value Date/Time    POCCOLOR DARK YELLOW 12/11/2024 09:43 AM    POCAPPEAR SLIGHTLY CLOUDY 12/11/2024 09:43 AM    POCLEUKEST TRACE 12/11/2024 09:43 AM    POCNITRITE POSITIVE 12/11/2024 09:43 AM    POCUROBILIGE 0.2 12/11/2024 09:43 AM    POCPROTEIN NEG 12/11/2024 09:43 AM    POCURPH 6.5 12/11/2024 09:43 AM    POCBLOOD NEG 12/11/2024 09:43 AM    POCSPGRV 1.020 12/11/2024 09:43 AM    POCKETONES NEG 12/11/2024 09:43 AM    POCBILIRUBIN NEG 12/11/2024 09:43 AM    POCGLUCUA NEG 12/11/2024 09:43 AM      CBC   Lab Results   Component Value Date/Time    WBC 9.2 05/10/2012 0209    RBC 4.63 (L) 05/10/2012 0209    HEMOGLOBIN 14.0 05/10/2012 0209    HEMATOCRIT 41.2 (L) 05/10/2012 0209    MCV 89.0 05/10/2012 0209    MCH 30.2 05/10/2012 0209    MCHC 34.0 05/10/2012 0209    RDW 14.5 05/10/2012 0209    MPV 9.0 05/10/2012 0209    LYMPHOCYTES 37.6 05/10/2012 0209    LYMPHS 3.5 05/10/2012 0209    MONOCYTES 6.3 05/10/2012 0209    EOSINOPHILS 1.3 05/10/2012 0209    BASOPHILS 0.3 05/10/2012 0209       CMP   Lab Results   Component Value Date/Time    SODIUM 140 05/10/2012 0209    POTASSIUM 3.9 05/10/2012 0209    CHLORIDE 108 05/10/2012 0209    CO2 24 05/10/2012 0209    ANION 8.0 05/10/2012 0209    GLUCOSE 99 05/10/2012 0209    BUN 8 05/10/2012 0209    CREATININE 0.92 05/10/2012 0209    CALCIUM 8.6 05/10/2012 0209    ASTSGOT 19 05/10/2012 0209    ALTSGPT 32 05/10/2012 0209    ALKPHOSPHAT 58 05/10/2012 0209    TBILIRUBIN 0.4 05/10/2012 0209    ALBUMIN 3.5 05/10/2012 0209    TOTPROTEIN 6.0 05/10/2012 0209    GLOBULIN 2.5 05/10/2012 0209    AGRATIO 1.4 05/10/2012 0209       Assessment: 34 y.o.male with a history of 3-4 months of stable pain with urination, including a sensation of pressure in the urethra. He has had to strain hard to urinate, leading to a hemorrhoid. Physical exam notable for urethral stenosis with severe meatal stenosis and possible urethral  fibrosis in the penile urethra.     We discussed the diagnosis of lichen sclerosis, how this can affect the urethra, and potential treatment options depending on the severity of his urethral stricture disease. If only the urethral meatus is involved, we can perform a meatoplasty at the time of evaluation, but if there is extensive urethral involvement we'll need to further discuss treatment options including one-stage or staged urethroplasty with BMG, perineal urethrostomy, SPT, etc.       Plan:    -Schedule retrograde urethrogram, urethral dilation, and possible meatoplasty as soon as possible    Tom Borden MD

## 2025-01-02 ENCOUNTER — APPOINTMENT (OUTPATIENT)
Dept: ADMISSIONS | Facility: MEDICAL CENTER | Age: 35
End: 2025-01-02
Attending: UROLOGY
Payer: COMMERCIAL

## 2025-01-03 ENCOUNTER — PRE-ADMISSION TESTING (OUTPATIENT)
Dept: ADMISSIONS | Facility: MEDICAL CENTER | Age: 35
End: 2025-01-03
Attending: UROLOGY
Payer: COMMERCIAL

## 2025-01-03 DIAGNOSIS — Z01.812 PRE-OPERATIVE LABORATORY EXAMINATION: ICD-10-CM

## 2025-01-03 LAB
ANION GAP SERPL CALC-SCNC: 10 MMOL/L (ref 7–16)
APPEARANCE UR: CLEAR
BASOPHILS # BLD AUTO: 0.4 % (ref 0–1.8)
BASOPHILS # BLD: 0.03 K/UL (ref 0–0.12)
BILIRUB UR QL STRIP.AUTO: NEGATIVE
BUN SERPL-MCNC: 11 MG/DL (ref 8–22)
CALCIUM SERPL-MCNC: 8.9 MG/DL (ref 8.5–10.5)
CHLORIDE SERPL-SCNC: 107 MMOL/L (ref 96–112)
CO2 SERPL-SCNC: 25 MMOL/L (ref 20–33)
COLOR UR: YELLOW
CREAT SERPL-MCNC: 0.82 MG/DL (ref 0.5–1.4)
EOSINOPHIL # BLD AUTO: 0.12 K/UL (ref 0–0.51)
EOSINOPHIL NFR BLD: 1.5 % (ref 0–6.9)
ERYTHROCYTE [DISTWIDTH] IN BLOOD BY AUTOMATED COUNT: 45.9 FL (ref 35.9–50)
GFR SERPLBLD CREATININE-BSD FMLA CKD-EPI: 117 ML/MIN/1.73 M 2
GLUCOSE SERPL-MCNC: 158 MG/DL (ref 65–99)
GLUCOSE UR STRIP.AUTO-MCNC: NEGATIVE MG/DL
HCT VFR BLD AUTO: 42.4 % (ref 42–52)
HGB BLD-MCNC: 14.1 G/DL (ref 14–18)
IMM GRANULOCYTES # BLD AUTO: 0.03 K/UL (ref 0–0.11)
IMM GRANULOCYTES NFR BLD AUTO: 0.4 % (ref 0–0.9)
KETONES UR STRIP.AUTO-MCNC: NEGATIVE MG/DL
LEUKOCYTE ESTERASE UR QL STRIP.AUTO: NEGATIVE
LYMPHOCYTES # BLD AUTO: 2.36 K/UL (ref 1–4.8)
LYMPHOCYTES NFR BLD: 30.4 % (ref 22–41)
MCH RBC QN AUTO: 30.1 PG (ref 27–33)
MCHC RBC AUTO-ENTMCNC: 33.3 G/DL (ref 32.3–36.5)
MCV RBC AUTO: 90.6 FL (ref 81.4–97.8)
MICRO URNS: NORMAL
MONOCYTES # BLD AUTO: 0.47 K/UL (ref 0–0.85)
MONOCYTES NFR BLD AUTO: 6.1 % (ref 0–13.4)
NEUTROPHILS # BLD AUTO: 4.75 K/UL (ref 1.82–7.42)
NEUTROPHILS NFR BLD: 61.2 % (ref 44–72)
NITRITE UR QL STRIP.AUTO: NEGATIVE
NRBC # BLD AUTO: 0 K/UL
NRBC BLD-RTO: 0 /100 WBC (ref 0–0.2)
PH UR STRIP.AUTO: 6 [PH] (ref 5–8)
PLATELET # BLD AUTO: 311 K/UL (ref 164–446)
PMV BLD AUTO: 10 FL (ref 9–12.9)
POTASSIUM SERPL-SCNC: 4.4 MMOL/L (ref 3.6–5.5)
PROT UR QL STRIP: NEGATIVE MG/DL
RBC # BLD AUTO: 4.68 M/UL (ref 4.7–6.1)
RBC UR QL AUTO: NEGATIVE
SODIUM SERPL-SCNC: 142 MMOL/L (ref 135–145)
SP GR UR STRIP.AUTO: 1.03
UROBILINOGEN UR STRIP.AUTO-MCNC: 1 EU/DL
WBC # BLD AUTO: 7.8 K/UL (ref 4.8–10.8)

## 2025-01-03 PROCEDURE — 85025 COMPLETE CBC W/AUTO DIFF WBC: CPT

## 2025-01-03 PROCEDURE — 36415 COLL VENOUS BLD VENIPUNCTURE: CPT

## 2025-01-03 PROCEDURE — 80048 BASIC METABOLIC PNL TOTAL CA: CPT

## 2025-01-03 PROCEDURE — 81003 URINALYSIS AUTO W/O SCOPE: CPT

## 2025-01-03 NOTE — PREADMIT AVS NOTE
Current Medications   Medication Instructions    tamsulosin (FLOMAX) 0.4 MG capsule Continue as prescribed    betamethasone valerate (VALISONE) 0.1 % Ointment Hold medication day of procedure

## 2025-01-09 ENCOUNTER — TELEPHONE (OUTPATIENT)
Dept: UROLOGY | Facility: MEDICAL CENTER | Age: 35
End: 2025-01-09
Payer: COMMERCIAL

## 2025-01-10 ENCOUNTER — ANESTHESIA EVENT (OUTPATIENT)
Dept: SURGERY | Facility: MEDICAL CENTER | Age: 35
End: 2025-01-10
Payer: COMMERCIAL

## 2025-01-10 ENCOUNTER — HOSPITAL ENCOUNTER (OUTPATIENT)
Facility: MEDICAL CENTER | Age: 35
End: 2025-01-10
Attending: UROLOGY | Admitting: UROLOGY
Payer: COMMERCIAL

## 2025-01-10 ENCOUNTER — APPOINTMENT (OUTPATIENT)
Dept: RADIOLOGY | Facility: MEDICAL CENTER | Age: 35
End: 2025-01-10
Attending: UROLOGY
Payer: COMMERCIAL

## 2025-01-10 ENCOUNTER — ANESTHESIA (OUTPATIENT)
Dept: SURGERY | Facility: MEDICAL CENTER | Age: 35
End: 2025-01-10
Payer: COMMERCIAL

## 2025-01-10 VITALS
RESPIRATION RATE: 18 BRPM | OXYGEN SATURATION: 94 % | BODY MASS INDEX: 38.28 KG/M2 | SYSTOLIC BLOOD PRESSURE: 140 MMHG | HEIGHT: 74 IN | DIASTOLIC BLOOD PRESSURE: 88 MMHG | HEART RATE: 63 BPM | TEMPERATURE: 97.4 F | WEIGHT: 298.28 LBS

## 2025-01-10 PROCEDURE — 160028 HCHG SURGERY MINUTES - 1ST 30 MINS LEVEL 3: Performed by: UROLOGY

## 2025-01-10 PROCEDURE — 700111 HCHG RX REV CODE 636 W/ 250 OVERRIDE (IP): Performed by: ANESTHESIOLOGY

## 2025-01-10 PROCEDURE — C1726 CATH, BAL DIL, NON-VASCULAR: HCPCS | Performed by: UROLOGY

## 2025-01-10 PROCEDURE — 160025 RECOVERY II MINUTES (STATS): Performed by: UROLOGY

## 2025-01-10 PROCEDURE — 700105 HCHG RX REV CODE 258: Performed by: UROLOGY

## 2025-01-10 PROCEDURE — 160009 HCHG ANES TIME/MIN: Performed by: UROLOGY

## 2025-01-10 PROCEDURE — 160048 HCHG OR STATISTICAL LEVEL 1-5: Performed by: UROLOGY

## 2025-01-10 PROCEDURE — 53600 DILATE URETHRA STRICTURE: CPT | Performed by: UROLOGY

## 2025-01-10 PROCEDURE — A9270 NON-COVERED ITEM OR SERVICE: HCPCS | Performed by: ANESTHESIOLOGY

## 2025-01-10 PROCEDURE — 700102 HCHG RX REV CODE 250 W/ 637 OVERRIDE(OP): Performed by: ANESTHESIOLOGY

## 2025-01-10 PROCEDURE — 160035 HCHG PACU - 1ST 60 MINS PHASE I: Performed by: UROLOGY

## 2025-01-10 PROCEDURE — 51610 INJECTION FOR BLADDER X-RAY: CPT | Performed by: UROLOGY

## 2025-01-10 PROCEDURE — 160039 HCHG SURGERY MINUTES - EA ADDL 1 MIN LEVEL 3: Performed by: UROLOGY

## 2025-01-10 PROCEDURE — 160002 HCHG RECOVERY MINUTES (STAT): Performed by: UROLOGY

## 2025-01-10 PROCEDURE — 160046 HCHG PACU - 1ST 60 MINS PHASE II: Performed by: UROLOGY

## 2025-01-10 PROCEDURE — 700117 HCHG RX CONTRAST REV CODE 255: Performed by: UROLOGY

## 2025-01-10 PROCEDURE — C1769 GUIDE WIRE: HCPCS | Performed by: UROLOGY

## 2025-01-10 PROCEDURE — 700101 HCHG RX REV CODE 250: Performed by: ANESTHESIOLOGY

## 2025-01-10 RX ORDER — ACETAMINOPHEN 500 MG
1000 TABLET ORAL ONCE
Status: COMPLETED | OUTPATIENT
Start: 2025-01-10 | End: 2025-01-10

## 2025-01-10 RX ORDER — HYDRALAZINE HYDROCHLORIDE 20 MG/ML
5 INJECTION INTRAMUSCULAR; INTRAVENOUS
Status: DISCONTINUED | OUTPATIENT
Start: 2025-01-10 | End: 2025-01-10 | Stop reason: HOSPADM

## 2025-01-10 RX ORDER — CEFAZOLIN SODIUM 1 G/3ML
INJECTION, POWDER, FOR SOLUTION INTRAMUSCULAR; INTRAVENOUS PRN
Status: DISCONTINUED | OUTPATIENT
Start: 2025-01-10 | End: 2025-01-10 | Stop reason: SURG

## 2025-01-10 RX ORDER — ONDANSETRON 2 MG/ML
4 INJECTION INTRAMUSCULAR; INTRAVENOUS
Status: DISCONTINUED | OUTPATIENT
Start: 2025-01-10 | End: 2025-01-10 | Stop reason: HOSPADM

## 2025-01-10 RX ORDER — DEXAMETHASONE SODIUM PHOSPHATE 4 MG/ML
INJECTION, SOLUTION INTRA-ARTICULAR; INTRALESIONAL; INTRAMUSCULAR; INTRAVENOUS; SOFT TISSUE PRN
Status: DISCONTINUED | OUTPATIENT
Start: 2025-01-10 | End: 2025-01-10 | Stop reason: SURG

## 2025-01-10 RX ORDER — MORPHINE SULFATE 4 MG/ML
3 INJECTION INTRAVENOUS
Status: DISCONTINUED | OUTPATIENT
Start: 2025-01-10 | End: 2025-01-10 | Stop reason: HOSPADM

## 2025-01-10 RX ORDER — SODIUM CHLORIDE, SODIUM LACTATE, POTASSIUM CHLORIDE, CALCIUM CHLORIDE 600; 310; 30; 20 MG/100ML; MG/100ML; MG/100ML; MG/100ML
INJECTION, SOLUTION INTRAVENOUS CONTINUOUS
Status: DISCONTINUED | OUTPATIENT
Start: 2025-01-10 | End: 2025-01-10 | Stop reason: HOSPADM

## 2025-01-10 RX ORDER — OXYCODONE HCL 5 MG/5 ML
10 SOLUTION, ORAL ORAL
Status: COMPLETED | OUTPATIENT
Start: 2025-01-10 | End: 2025-01-10

## 2025-01-10 RX ORDER — OXYCODONE HCL 5 MG/5 ML
5 SOLUTION, ORAL ORAL
Status: COMPLETED | OUTPATIENT
Start: 2025-01-10 | End: 2025-01-10

## 2025-01-10 RX ORDER — SODIUM CHLORIDE, SODIUM LACTATE, POTASSIUM CHLORIDE, CALCIUM CHLORIDE 600; 310; 30; 20 MG/100ML; MG/100ML; MG/100ML; MG/100ML
INJECTION, SOLUTION INTRAVENOUS CONTINUOUS
Status: ACTIVE | OUTPATIENT
Start: 2025-01-10 | End: 2025-01-10

## 2025-01-10 RX ORDER — EPHEDRINE SULFATE 50 MG/ML
5 INJECTION, SOLUTION INTRAVENOUS
Status: DISCONTINUED | OUTPATIENT
Start: 2025-01-10 | End: 2025-01-10 | Stop reason: HOSPADM

## 2025-01-10 RX ORDER — MIDAZOLAM HYDROCHLORIDE 1 MG/ML
INJECTION INTRAMUSCULAR; INTRAVENOUS PRN
Status: DISCONTINUED | OUTPATIENT
Start: 2025-01-10 | End: 2025-01-10 | Stop reason: SURG

## 2025-01-10 RX ORDER — LIDOCAINE HYDROCHLORIDE 20 MG/ML
INJECTION, SOLUTION EPIDURAL; INFILTRATION; INTRACAUDAL; PERINEURAL PRN
Status: DISCONTINUED | OUTPATIENT
Start: 2025-01-10 | End: 2025-01-10 | Stop reason: SURG

## 2025-01-10 RX ORDER — MORPHINE SULFATE 4 MG/ML
2 INJECTION INTRAVENOUS
Status: DISCONTINUED | OUTPATIENT
Start: 2025-01-10 | End: 2025-01-10 | Stop reason: HOSPADM

## 2025-01-10 RX ORDER — HALOPERIDOL 5 MG/ML
1 INJECTION INTRAMUSCULAR
Status: DISCONTINUED | OUTPATIENT
Start: 2025-01-10 | End: 2025-01-10 | Stop reason: HOSPADM

## 2025-01-10 RX ORDER — METOPROLOL TARTRATE 1 MG/ML
1 INJECTION, SOLUTION INTRAVENOUS
Status: DISCONTINUED | OUTPATIENT
Start: 2025-01-10 | End: 2025-01-10 | Stop reason: HOSPADM

## 2025-01-10 RX ORDER — DIPHENHYDRAMINE HYDROCHLORIDE 50 MG/ML
12.5 INJECTION INTRAMUSCULAR; INTRAVENOUS
Status: DISCONTINUED | OUTPATIENT
Start: 2025-01-10 | End: 2025-01-10 | Stop reason: HOSPADM

## 2025-01-10 RX ORDER — ALBUTEROL SULFATE 5 MG/ML
2.5 SOLUTION RESPIRATORY (INHALATION)
Status: DISCONTINUED | OUTPATIENT
Start: 2025-01-10 | End: 2025-01-10 | Stop reason: HOSPADM

## 2025-01-10 RX ADMIN — CEFAZOLIN 3 G: 1 INJECTION, POWDER, FOR SOLUTION INTRAMUSCULAR; INTRAVENOUS at 09:19

## 2025-01-10 RX ADMIN — FENTANYL CITRATE 25 MCG: 50 INJECTION, SOLUTION INTRAMUSCULAR; INTRAVENOUS at 09:06

## 2025-01-10 RX ADMIN — LIDOCAINE HYDROCHLORIDE 50 MG: 20 INJECTION, SOLUTION EPIDURAL; INFILTRATION; INTRACAUDAL; PERINEURAL at 09:08

## 2025-01-10 RX ADMIN — DEXAMETHASONE SODIUM PHOSPHATE 8 MG: 4 INJECTION INTRA-ARTICULAR; INTRALESIONAL; INTRAMUSCULAR; INTRAVENOUS; SOFT TISSUE at 09:30

## 2025-01-10 RX ADMIN — FENTANYL CITRATE 50 MCG: 50 INJECTION, SOLUTION INTRAMUSCULAR; INTRAVENOUS at 09:21

## 2025-01-10 RX ADMIN — Medication 140 MG: at 09:08

## 2025-01-10 RX ADMIN — Medication 30 MG: at 09:34

## 2025-01-10 RX ADMIN — SODIUM CHLORIDE, POTASSIUM CHLORIDE, SODIUM LACTATE AND CALCIUM CHLORIDE: 600; 310; 30; 20 INJECTION, SOLUTION INTRAVENOUS at 07:36

## 2025-01-10 RX ADMIN — PROPOFOL 200 MG: 10 INJECTION, EMULSION INTRAVENOUS at 09:08

## 2025-01-10 RX ADMIN — LIDOCAINE HYDROCHLORIDE 50 MG: 20 INJECTION, SOLUTION EPIDURAL; INFILTRATION; INTRACAUDAL; PERINEURAL at 09:34

## 2025-01-10 RX ADMIN — FENTANYL CITRATE 25 MCG: 50 INJECTION, SOLUTION INTRAMUSCULAR; INTRAVENOUS at 09:34

## 2025-01-10 RX ADMIN — OXYCODONE HYDROCHLORIDE 10 MG: 5 SOLUTION ORAL at 10:04

## 2025-01-10 RX ADMIN — ROCURONIUM BROMIDE 5 MG: 10 INJECTION, SOLUTION INTRAVENOUS at 09:08

## 2025-01-10 RX ADMIN — ACETAMINOPHEN 1000 MG: 500 TABLET ORAL at 07:35

## 2025-01-10 RX ADMIN — MIDAZOLAM HYDROCHLORIDE 2 MG: 1 INJECTION, SOLUTION INTRAMUSCULAR; INTRAVENOUS at 09:06

## 2025-01-10 ASSESSMENT — PAIN DESCRIPTION - PAIN TYPE
TYPE: SURGICAL PAIN

## 2025-01-10 NOTE — OR NURSING
1051 To Phase II from PACU. Ambulated to chair from [facility]. Pain tolerable 5/10. No nausea. Tolerating PO sips and snacks. Family called to come to bedside.     1130 Discharge orders received. Meets discharge criteria at this time. Tolerable pain. No nausea. Tolerating PO. On RA. Monitors discontinued. IV removed with tip intact. Reviewed discharge paperwork with pt and mother. Discussed diet, activity, medications, gooden care, follow up care and worsening symptoms. No questions at this time. Pt to be discharged home via private vehicle. Pt escorted out of department in wheelchair with RN, family, and all belongings.

## 2025-01-10 NOTE — DISCHARGE INSTR - OTHER INFO
Postoperative Instructions following urethral dilation:  -Stay very well hydrated for the next week.  -You can expect to see some blood in the urine, or exiting the urethra around the outside of the catheter. This is normal, but drink plenty of water to help flush out any blood.  -Avoid any heavy lifting or straining for the next week.  -Do not strain to have a bowel movement. If you are not able to pass stool without straining, take stool softeners, fiber supplements, or polyethylene glycol (like Miralax) to help pass the stool without straining.   -We will remove the catheter in the office in about 10 days. If you have any concerns or questions before that time, please call Dr. Borden's office at [phone].

## 2025-01-10 NOTE — OR NURSING
Patient arrived in PACU, VSS. Lopez present draining straw colored urine.   Dr. Borden at bedside speaking with patient about procedure.   Report called to GASPER Fernandez. Transported to phase 2 on room air.

## 2025-01-10 NOTE — ANESTHESIA PROCEDURE NOTES
Airway    Date/Time: 1/10/2025 9:09 AM    Performed by: Mor Valle M.D.  Authorized by: Mor Valle M.D.    Location:  OR  Urgency:  Elective  Indications for Airway Management:  Anesthesia      Spontaneous Ventilation: absent    Sedation Level:  Deep  Preoxygenated: Yes    Patient Position:  Sniffing  Final Airway Type:  Endotracheal airway  Final Endotracheal Airway:  ETT  Cuffed: Yes    Technique Used for Successful ETT Placement:  Direct laryngoscopy    Insertion Site:  Oral  Blade Type:  Bustos  Laryngoscope Blade/Videolaryngoscope Blade Size:  2  ETT Size (mm):  8.0  Measured from:  Teeth  ETT to Teeth (cm):  24  Placement Verified by: auscultation and capnometry    Cormack-Lehane Classification:  Grade I - full view of glottis  Number of Attempts at Approach:  1   LTA lido then ETT

## 2025-01-10 NOTE — ANESTHESIA PREPROCEDURE EVALUATION
Case: [id number] Date/Time: 01/10/25 0845    Procedures:       RETROGRADE URETHROGRAM, URETHRAL DILATION, POSSIBLE MEATOPLASTY      MEATOTOMY, URETHRA    Pre-op diagnosis: ANTERIOR URETHRAL STRICTURE, LICHEN SCLEROSIS    Location: [facility] OR 06 / SURGERY [facility]    Surgeons: Tom Borden M.D.          Bmi 38- 298 lbs  Relevant Problems   No relevant active problems       Physical Exam    Airway   Mallampati: II  TM distance: >3 FB  Neck ROM: full       Cardiovascular - normal exam  Rhythm: regular  Rate: normal  (-) murmur     Dental - normal exam           Pulmonary - normal exam  Breath sounds clear to auscultation     Abdominal    Neurological - normal exam                   Anesthesia Plan    ASA 2       Plan - general       Airway plan will be ETT          Induction: intravenous    Postoperative Plan: Postoperative administration of opioids is intended.    Pertinent diagnostic labs and testing reviewed    Informed Consent:    Anesthetic plan and risks discussed with patient.    Use of blood products discussed with: patient whom consented to blood products.

## 2025-01-10 NOTE — OR SURGEON
Immediate Post OP Note    PreOp Diagnosis: Meatal stenosis      PostOp Diagnosis: Panurethral stricture      Procedure(s):  RETROGRADE URETHROGRAM, URETHRAL DILATION - Wound Class: Clean Contaminated    Surgeon(s):  Tom Borden M.D.    Anesthesiologist/Type of Anesthesia:  Anesthesiologist: Mor Valle M.D./General    Surgical Staff:  Circulator: Kings Rasheed R.N.  Scrub Person: Sallie Cheng  X-Ray Technologist: Ravin Infante    Specimens removed if any:  * No specimens in log *    Estimated Blood Loss: 0    Findings: Panurethral stricture extending from meatus to proximal bulbous urethra    Complications: None        1/10/2025 9:49 AM Tom Borden M.D.

## 2025-01-10 NOTE — OP REPORT
Operative Report    Patient: Anival Brooks    MRN: [id number]    Operation: Retrograde urethrogram, urethral dilation    Anesthesia: General    EBL: 0 mL    Surgeon: Tom Borden MD    Assistant(s): n/a    Preoperative diagnosis: Meatal stenosis    Postoperative diagnosis: Panurethral stricture    Operative indications: Anival Brooks is a 34 y.o. male who presented with obstructive urinary complaints and was found on exam to have a very tight meatal stenosis, as well as signs of lichen sclerosus on the glans skin and some palpable urethral fibrosis in the pendulous urethra. He is here today for retrograde urethrogram to determine the extent of his urethral stricture, possible meatoplasty if the stricture includes only the meatus, and urethral dilation if needed for more extensive stricture, with possible future urethral reconstruction depending on today's findings.     Operative details: The patient was brought to the operating room and placed on the operating table in supine position. After administration of anesthesia, he was placed in a modified right lateral decubitus position with all pressure points well padded. His hips were at a 45 degree angle to the table. He was prepped and draped in usual sterile fashion. After performing a surgical time-out, the case began by performing a retrograde urethrogram. The penis was held on a moderate degree of stretch. An 18 angiocath was secured to a syringe with undiluted contrast solution, and the angiocath inserted into the stenotic meatus. The contrast solution was injected under low pressure, and images obtained of the length of the urethra. This was notable for a panurethral stricture, with very tight stenosis of the fossa navicularis, and continued but more moderate stenosis of the pendulous and bulbous urethra, extending all the way to the bulbomembranous junction.     Given the severity of the stricture and our preoperative conversation on treatment  options, I did elect to proceed with urethral dilation. A Sensor wire was advanced through the urethra and up to the bladder as confirmed with fluoroscopy. An 18 Fr x 10 cm Uromax balloon dilator was then used to dilate the fossa navicularis and pendulous urethra. The balloon was fully inflated and left inflated for about 3 minutes. It was then deflated and advanced to allow dilation of the bulbous urethra. The balloon was again left inflated for 3 minutes, and then deflated. After the entire urethra was dilated, I attempted to pass a 16 Fr Councill catheter over the wire, but there was too much resistance a the meatus. We then used a 14 Fr silicone catheter, with the tip pierced with an angiocath to allow passage over the wire, and this was able to advance to the bladder with return of clear yellow urine. The catheter balloon was filled with 10 cc of sterile water and the catheter then secured to a drainage bag. The patient was cleaned and dried and placed back into the supine position, and then woken from anesthesia.  The patient was then transferred to the recovery room in stable position.    Plan:  -Void trial next week  -Will teach CIC, likely with use of betamethasone cream for treatment of LS  -Will discuss long-term treatment options including continued CIC, panurethral reconstruction with BMG (Sidney-style panurethral reconstruction), and formation of a perineal urethrostomy with dorsal BMG      Tom Borden MD

## 2025-01-10 NOTE — ANESTHESIA POSTPROCEDURE EVALUATION
Patient: Anival Brooks    Procedure Summary       Date: 01/10/25 Room / Location: [facility] 06 / SURGERY [facility]    Anesthesia Start: 0903 Anesthesia Stop: 0959    Procedure: RETROGRADE URETHROGRAM, URETHRAL DILATION (Urethra) Diagnosis: (ANTERIOR URETHRAL STRICTURE, LICHEN SCLEROSIS)    Surgeons: Tom Borden M.D. Responsible Provider: Mor Valle M.D.    Anesthesia Type: general ASA Status: 2            Final Anesthesia Type: general  Last vitals  BP   Blood Pressure: (!) 140/88    Temp   36.3 °C (97.4 °F)    Pulse   63   Resp   18    SpO2   94 %      Anesthesia Post Evaluation    Patient location during evaluation: PACU  Patient participation: complete - patient participated  Level of consciousness: awake and alert    Airway patency: patent  Anesthetic complications: no  Cardiovascular status: hemodynamically stable  Respiratory status: acceptable  Hydration status: euvolemic    PONV: none          No notable events documented.     Nurse Pain Score: 5 (NPRS)

## 2025-01-10 NOTE — DISCHARGE INSTRUCTIONS
HOME CARE INSTRUCTIONS    ACTIVITY: Rest and take it easy for the first 24 hours.  A responsible adult is recommended to remain with you during that time.  It is normal to feel sleepy.  We encourage you to not do anything that requires balance, judgment or coordination.    FOR 24 HOURS DO NOT:  Drive, operate machinery or run household appliances.  Drink beer or alcoholic beverages.  Make important decisions or sign legal documents.    SPECIAL INSTRUCTIONS:     Postoperative Instructions following urethral dilation:  -Stay very well hydrated for the next week.  -You can expect to see some blood in the urine, or exiting the urethra around the outside of the catheter. This is normal, but drink plenty of water to help flush out any blood.  -Avoid any heavy lifting or straining for the next week.  -Do not strain to have a bowel movement. If you are not able to pass stool without straining, take stool softeners, fiber supplements, or polyethylene glycol (like Miralax) to help pass the stool without straining.   -We will remove the catheter in the office in about 10 days. If you have any concerns or questions before that time, please call Dr. Borden's office at [phone].      Indwelling Urinary Catheter Insertion, Care After  After the procedure, it is common to have:  Slight discomfort around your urethra where the catheter enters your body.  Follow these instructions at home:  General instructions  Keep the drainage bag at or below the level of your bladder. By doing this, your urine can only drain out instead of going back into your body.  Secure the catheter tubing and drainage bag to your leg or thigh to keep it from moving.  Check the catheter tubing regularly to make sure there are no kinks or blockages.  Take showers daily to keep the catheter clean. Do not take a bath.  Do not pull on your catheter.  Disconnect the tubing and drainage bag as little as possible.  Empty the drainage bag every 2-4 hours, or more  often if needed. Do not let the bag get completely full.  Wash your hands with soap and water before and after touching the catheter, tubing, or drainage bag.  Do not let the drainage bag or catheter tubing touch the floor.  Drink enough fluids to keep your urine pale yellow, or as told by your health care provider.  Safety  Let your health care provider know if:  Your bladder is full, but you are not able to urinate.  You have removed the catheter, but you are not able to urinate after 8 hours.  Contact a health care provider if:  Your urine:  Looks cloudy.  Has a bad smell.  Stops flowing into the drainage bag.  Your catheter:  Gets clogged.  Starts to leak.  You feel pain or pressure in the bladder area.  You have back pain.  Your drainage bag or tubing looks dirty.  Get help right away if:  You have a fever or chills.  You have severe pain in your back or your lower abdomen.  You have warmth, redness, swelling, or pain in the urethra area.  You notice blood in your urine.  Your catheter gets pulled out.  Summary  Wash your hands with soap and water before and after touching the catheter, tubing, or drainage bag.  Do not pull on your catheter or try to remove it.  Keep the drainage bag at or below the level of your bladder, but do not let the drainage bag or catheter tubing touch the floor.  Get help right away if you have a fever, chills, or any other signs of infection.  This information is not intended to replace advice given to you by your health care provider. Make sure you discuss any questions you have with your health care provider.      DIET: To avoid nausea, slowly advance diet as tolerated, avoiding spicy or greasy foods for the first day.  Add more substantial food to your diet according to your physician's instructions.  Babies can be fed formula or breast milk as soon as they are hungry.  INCREASE FLUIDS AND FIBER TO AVOID CONSTIPATION.    SURGICAL DRESSING/BATHING: N/A    MEDICATIONS: Resume taking  daily medication.  Take prescribed pain medication with food.  If no medication is prescribed, you may take non-aspirin pain medication if needed.  PAIN MEDICATION CAN BE VERY CONSTIPATING.  Take a stool softener or laxative such as senokot, pericolace, or milk of magnesia if needed.    No new prescriptions.  Last pain medication given at 7:35am (tylenol) and 10:04am (oxycodone).    A follow-up appointment should be arranged with your doctor in 1-2 weeks; call to schedule.    You should CALL YOUR PHYSICIAN if you develop:  Fever greater than 101 degrees F.  Pain not relieved by medication, or persistent nausea or vomiting.  Excessive bleeding (blood soaking through dressing) or unexpected drainage from the wound.  Extreme redness or swelling around the incision site, drainage of pus or foul smelling drainage.  Inability to urinate or empty your bladder within 8 hours.  Problems with breathing or chest pain.    You should call 911 if you develop problems with breathing or chest pain.  If you are unable to contact your doctor or surgical center, you should go to the nearest emergency room or urgent care center.  Physician's telephone #: Dr. Borden [phone].    MILD FLU-LIKE SYMPTOMS ARE NORMAL.  YOU MAY EXPERIENCE GENERALIZED MUSCLE ACHES, THROAT IRRITATION, HEADACHE AND/OR SOME NAUSEA.    If any questions arise, call your doctor.  If your doctor is not available, please feel free to call the Surgical Center at [phone].  The Center is open Monday through Friday from 7AM to 7PM.      A registered nurse may call you a few days after your surgery to see how you are doing after your procedure.    You may also receive a survey in the mail within the next two weeks and we ask that you take a few moments to complete the survey and return it to us.  Our goal is to provide you with very good care and we value your comments.     Depression / Suicide Risk    As you are discharged from this [facility] facility, it  is important to learn how to keep safe from harming yourself.    Recognize the warning signs:  Abrupt changes in personality, positive or negative- including increase in energy   Giving away possessions  Change in eating patterns- significant weight changes-  positive or negative  Change in sleeping patterns- unable to sleep or sleeping all the time   Unwillingness or inability to communicate  Depression  Unusual sadness, discouragement and loneliness  Talk of wanting to die  Neglect of personal appearance   Rebelliousness- reckless behavior  Withdrawal from people/activities they love  Confusion- inability to concentrate     If you or a loved one observes any of these behaviors or has concerns about self-harm, here's what you can do:  Talk about it- your feelings and reasons for harming yourself  Remove any means that you might use to hurt yourself (examples: pills, rope, extension cords, firearm)  Get professional help from the community (Mental Health, Substance Abuse, psychological counseling)  Do not be alone:Call your Safe Contact- someone whom you trust who will be there for you.  Call your local CRISIS HOTLINE 868-1037 or 373-508-2500  Call your local Children's Mobile Crisis Response Team [place] (214) 793-2018 or www.[employer]  Call the toll free National Suicide Prevention Hotlines   National Suicide Prevention Lifeline 075-514-YGYK (3426)  National Hope Line Network 800-SUICIDE (342-5502)    I acknowledge receipt and understanding of these Home Care instructions.

## 2025-01-10 NOTE — INTERVAL H&P NOTE
Patient seen and examined in preoperative area. No changes to above history and physical exam.     We will proceed to OR as planned for retrograde urethrogram, and then either meatoplasty or urethral dilation depending on RUG findings.

## 2025-01-10 NOTE — ANESTHESIA TIME REPORT
Anesthesia Start and Stop Event Times       Date Time Event    1/10/2025 0852 Ready for Procedure     0903 Anesthesia Start     0959 Anesthesia Stop          Responsible Staff  01/10/25      Name Role Begin End    Mor Valle M.D. Anesth 0903 0959          Overtime Reason:  no overtime (within assigned shift)    Comments:

## 2025-01-17 ENCOUNTER — OFFICE VISIT (OUTPATIENT)
Dept: UROLOGY | Facility: MEDICAL CENTER | Age: 35
End: 2025-01-17
Payer: COMMERCIAL

## 2025-01-17 DIAGNOSIS — N35.914 ANTERIOR URETHRAL STRICTURE: ICD-10-CM

## 2025-01-17 LAB
POC POST-VOID: 83 ML
POC PRE-VOID: NORMAL

## 2025-01-17 PROCEDURE — 99214 OFFICE O/P EST MOD 30 MIN: CPT | Performed by: STUDENT IN AN ORGANIZED HEALTH CARE EDUCATION/TRAINING PROGRAM

## 2025-01-17 PROCEDURE — 51798 US URINE CAPACITY MEASURE: CPT | Performed by: STUDENT IN AN ORGANIZED HEALTH CARE EDUCATION/TRAINING PROGRAM

## 2025-01-17 NOTE — PATIENT INSTRUCTIONS
Clean Intermittent Catheterization     Before You Begin:     Gather Your Supplies: Ensure you have all the necessary supplies ready, including a clean catheter, a container for urine collection, water-soluble lubricant, soap, clean towels or wipes, and hand .   Wash Your Hands: Thoroughly wash your hands with soap and water for at least 20 seconds. Dry your hands with a clean towel.     Preparing the Catheter:     Open Catheter Package: Open the catheter packaging carefully, avoiding contact with the catheter tip and ensuring it remains sterile.   Apply Lubricant: Apply a generous amount of water-soluble lubricant to the tip of the catheter. This will ease insertion and minimize discomfort.     Catheterization Procedure:     Find a Comfortable Position: Sit on the toilet, wheelchair, or a stable surface with your legs spread apart. Alternatively, you can perform CIC while lying down with your knees bent and feet flat on the bed.   Clean the Genital Area: Use a sanitizing wipe to gently clean the genital area. Wipe from front to back to prevent contamination.   Prepare a Collection Container: Place the urine collection container within reach.   Insert the Catheter: Holding the catheter near the tip, gently insert it into the urethra until urine begins to flow. Be patient and relax your muscles to facilitate insertion. Advance the catheter slowly and steadily.   Drain Your Bladder: Allow urine to flow into the collection container. Drain your bladder completely. If urine flow stops before your bladder is empty, gently reposition the catheter.   Remove the Catheter: Once your bladder is empty, carefully and slowly remove the catheter. Dispose of the catheter in a proper disposal container, as instructed by your healthcare provider.     After Catheterization:     Wash Your Hands Again: After the procedure, wash your hands thoroughly with soap and water for at least 20 seconds.   Record the Details: Keep a  record of the date, time, amount of urine drained, and any observations (color, odor, presence of blood) for future reference or as directed by your healthcare provider.     General Tips:     Stay calm and relaxed during the procedure. Tension can make insertion more challenging.   If you encounter resistance, do not force the catheter. Contact your healthcare provider if you are unable to insert it.   Perform CIC as prescribed by your healthcare provider, whether on a fixed schedule or as needed.   Stay hydrated to ensure an adequate urine flow during catheterization.     Contact Your Healthcare Provider If:     You experience pain, discomfort, or bleeding during or after catheterization.   You notice signs of infection (fever, chills, foul-smelling urine, or increased pain).   Catheterization becomes increasingly difficult or unsuccessful.

## 2025-01-18 NOTE — PROGRESS NOTES
Subjective  Anival Brooks is a 34 y.o. male who presents today for post-operative follow up.     Procedure: cystoscopy with urethral balloon dilation and catheter placement with Dr. Borden    Procedure date: 1/10/2025     Lopez: Removed this AM    Post-op course: He had a lot of pain/discomfort with the catheter at the head of the penis. He wanted the catheter out this morning for improved comfort. Otherwise he has been recovering well.        Family History   Problem Relation Age of Onset    Diabetes Father     Hypertension Father     Diabetes Sister     Stroke Neg Hx     Heart Disease Neg Hx     Cancer Neg Hx     Lung Disease Neg Hx        Social History     Socioeconomic History    Marital status:      Spouse name: Not on file    Number of children: Not on file    Years of education: Not on file    Highest education level: Not on file   Occupational History    Not on file   Tobacco Use    Smoking status: Never     Passive exposure: Never    Smokeless tobacco: Never   Vaping Use    Vaping status: Never Used   Substance and Sexual Activity    Alcohol use: No    Drug use: No    Sexual activity: Yes     Partners: Female   Other Topics Concern    Not on file   Social History Narrative    Not on file     Social Drivers of Health     Financial Resource Strain: Not on file   Food Insecurity: Not on file   Transportation Needs: Not on file   Physical Activity: Not on file   Stress: Not on file   Social Connections: Not on file   Intimate Partner Violence: Not on file   Housing Stability: Not on file       Past Surgical History:   Procedure Laterality Date    URETHROGRAM, RETROGRADE, DIAGNOSTIC N/A 1/10/2025    Procedure: RETROGRADE URETHROGRAM, URETHRAL DILATION;  Surgeon: Tom Borden M.D.;  Location: SURGERY [facility];  Service: Urology       Past Medical History:   Diagnosis Date    Difficult or painful urination 01/03/2025    Hemorrhoid 01/03/2025    history of       Current Outpatient Medications    Medication Sig Dispense Refill    betamethasone valerate (VALISONE) 0.1 % Ointment Apply 1 g topically every day. Apply to the bladder catheter prior to insertion 45 g 3    tamsulosin (FLOMAX) 0.4 MG capsule Take 1 Capsule by mouth 1/2 hour after breakfast. 30 Capsule 11    betamethasone valerate (VALISONE) 0.1 % Ointment Apply 1 g topically 2 times a day. 45 g 0     No current facility-administered medications for this visit.       No Known Allergies    Objective  There were no vitals taken for this visit.  Physical Exam  Constitutional:       Appearance: Normal appearance.   HENT:      Head: Normocephalic and atraumatic.   Pulmonary:      Effort: Pulmonary effort is normal.   Skin:     General: Skin is warm and dry.   Neurological:      General: No focal deficit present.      Mental Status: He is alert.   Psychiatric:         Mood and Affect: Mood normal.         Behavior: Behavior normal.         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 Brooks is a 34-year-old gentleman who recently underwent cystoscopy with retrograde urethrogram, urethral dilation, and catheter placement on 1/10/2025 with Dr. Ramirez.  Imaging at that time demonstrated a panurethral stricture and exam showed urethral fibrosis along the penile urethra.  These results were discussed with the patient.  His Lopez catheter was removed this morning.  We discussed that his stricture is due to lichen sclerosis and that the recommendation is for self-catheterization once daily with betamethasone on the catheter to help reduce inflammation and prevent recurrence of the stricture.  Even with the self-catheterization daily there is a risk of recurrence.  At the beginning of the conversation he inquired if his bladder could just be removed.  We discussed why this is not recommended.  We discussed the different options including daily  self-catheterization, buccal graft urethroplasty, and perineal urethrostomy with buccal graft.  After a thorough discussion of the risks and benefits of each procedure the patient is willing to proceed with buccal graft urethroplasty.  He will call us if he has any difficulties with self-catheterization.  He was provided with information on buccal graft urethroplasty and perineal urethrostomy.    Plan    1. Anterior urethral stricture  - POCT Bladder Scan    Other orders  - betamethasone valerate (VALISONE) 0.1 % Ointment; Apply 1 g topically every day. Apply to the bladder catheter prior to insertion  Dispense: 45 g; Refill: 3    Patient will be scheduled with Dr. Ramirez for buccal graft urethroplasty  Patient provided CIC teaching  Patient provided with prescription for betamethasone ointment

## 2025-01-27 ENCOUNTER — OFFICE VISIT (OUTPATIENT)
Dept: UROLOGY | Facility: MEDICAL CENTER | Age: 35
End: 2025-01-27
Payer: COMMERCIAL

## 2025-01-27 DIAGNOSIS — N35.914 ANTERIOR URETHRAL STRICTURE: ICD-10-CM

## 2025-01-27 DIAGNOSIS — L90.0 LICHEN SCLEROSUS: ICD-10-CM

## 2025-01-27 PROCEDURE — 99215 OFFICE O/P EST HI 40 MIN: CPT | Mod: 57 | Performed by: UROLOGY

## 2025-01-27 RX ORDER — LIDOCAINE/PRILOCAINE 2.5 %-2.5%
1 CREAM (GRAM) TOPICAL PRN
Qty: 90 G | Refills: 0 | Status: SHIPPED | OUTPATIENT
Start: 2025-01-27

## 2025-01-27 NOTE — PROGRESS NOTES
Chief Complaint: urethral stricture    HPI: Anival Brooks is a 34 y.o. male with a history of a panurethral stricture related to lichen sclerosus, here today to be instructed on meatal/fossa zeyad dilation and also to discuss in greater detail his treatment options.     We performed a RUG followed by urethral balloon dilation on 1/10/2025. His meatus and fossa navicularis have already re-stenosed, making it impossible for him to catheterize.             Past Medical History:  Past Medical History:   Diagnosis Date    Difficult or painful urination 01/03/2025    Hemorrhoid 01/03/2025    history of       Past Surgical History:  Past Surgical History:   Procedure Laterality Date    URETHROGRAM, RETROGRADE, DIAGNOSTIC N/A 1/10/2025    Procedure: RETROGRADE URETHROGRAM, URETHRAL DILATION;  Surgeon: Tom Borden M.D.;  Location: SURGERY [facility];  Service: Urology       Family History:  Family History   Problem Relation Age of Onset    Diabetes Father     Hypertension Father     Diabetes Sister     Stroke Neg Hx     Heart Disease Neg Hx     Cancer Neg Hx     Lung Disease Neg Hx        Social History:  Social History     Socioeconomic History    Marital status:      Spouse name: Not on file    Number of children: Not on file    Years of education: Not on file    Highest education level: Not on file   Occupational History    Not on file   Tobacco Use    Smoking status: Never     Passive exposure: Never    Smokeless tobacco: Never   Vaping Use    Vaping status: Never Used   Substance and Sexual Activity    Alcohol use: No    Drug use: No    Sexual activity: Yes     Partners: Female   Other Topics Concern    Not on file   Social History Narrative    Not on file     Social Drivers of Health     Financial Resource Strain: Not on file   Food Insecurity: Not on file   Transportation Needs: Not on file   Physical Activity: Not on file   Stress: Not on file   Social Connections: Not on file   Intimate Partner  Violence: Not on file   Housing Stability: Not on file       Medications:  Current Outpatient Medications   Medication Sig Dispense Refill    lidocaine-prilocaine (EMLA) 2.5-2.5 % Cream Apply 1 Application topically as needed (daily prior to dilation procedure). 90 g 0    betamethasone valerate (VALISONE) 0.1 % Ointment Apply 1 g topically every day. Apply to the bladder catheter prior to insertion 45 g 3    tamsulosin (FLOMAX) 0.4 MG capsule Take 1 Capsule by mouth 1/2 hour after breakfast. 30 Capsule 11    betamethasone valerate (VALISONE) 0.1 % Ointment Apply 1 g topically 2 times a day. 45 g 0     No current facility-administered medications for this visit.       Allergies:  No Known Allergies    Review of Systems:  Constitutional: Negative for fever, chills and malaise/fatigue.   HENT: Negative for congestion.    Eyes: Negative for pain.   Respiratory: Negative for cough and shortness of breath.    Cardiovascular: Negative for leg swelling.   Gastrointestinal: Negative for nausea, vomiting, abdominal pain and diarrhea.   Genitourinary: Negative for dysuria and hematuria.   Skin: Negative for rash.   Neurological: Negative for dizziness, focal weakness and headaches.   Endo/Heme/Allergies: Does not bruise/bleed easily.   Psychiatric/Behavioral: Negative for depression.  The patient is not nervous/anxious.        Physical Exam:  There were no vitals filed for this visit.    GENERAL: well appearing, well nourished, NAD  RESP: respiratory effort normal  ABDOMEN: soft, nontender, nondistended, no masses or organomegaly  HERNIAS: no hernias found on exam  SKIN/LYMPH: normal coloration and turgor, no suspicious skin lesions noted  NEURO/PSYCH: alert, oriented, normal speech, no focal findings or movement disorder noted  EXTREMITIES: peripheral pulses normal, no pedal edema, no clubbing or cyanosis  GENITOURINARY:  penis circumcised, LS on glans, meatus stenotic, palpable penile urethral fibrosis  RECTAL: Exam  Deferred      Data Review:    Labs:  POCT UA   Lab Results   Component Value Date/Time    POCCOLOR DARK YELLOW 12/11/2024 09:43 AM    POCAPPEAR SLIGHTLY CLOUDY 12/11/2024 09:43 AM    POCLEUKEST TRACE 12/11/2024 09:43 AM    POCNITRITE POSITIVE 12/11/2024 09:43 AM    POCUROBILIGE 0.2 12/11/2024 09:43 AM    POCPROTEIN NEG 12/11/2024 09:43 AM    POCURPH 6.5 12/11/2024 09:43 AM    POCBLOOD NEG 12/11/2024 09:43 AM    POCSPGRV 1.020 12/11/2024 09:43 AM    POCKETONES NEG 12/11/2024 09:43 AM    POCBILIRUBIN NEG 12/11/2024 09:43 AM    POCGLUCUA NEG 12/11/2024 09:43 AM      CBC   Lab Results   Component Value Date/Time    WBC 7.8 01/03/2025 1009    RBC 4.68 (L) 01/03/2025 1009    HEMOGLOBIN 14.1 01/03/2025 1009    HEMATOCRIT 42.4 01/03/2025 1009    MCV 90.6 01/03/2025 1009    MCH 30.1 01/03/2025 1009    MCHC 33.3 01/03/2025 1009    RDW 45.9 01/03/2025 1009    MPV 10.0 01/03/2025 1009    LYMPHOCYTES 30.40 01/03/2025 1009    LYMPHS 2.36 01/03/2025 1009    MONOCYTES 6.10 01/03/2025 1009    MONOS 0.47 01/03/2025 1009    EOSINOPHILS 1.50 01/03/2025 1009    EOS 0.12 01/03/2025 1009    BASOPHILS 0.40 01/03/2025 1009    BASO 0.03 01/03/2025 1009    NRBC 0.00 01/03/2025 1009       CMP   Lab Results   Component Value Date/Time    SODIUM 142 01/03/2025 1009    POTASSIUM 4.4 01/03/2025 1009    CHLORIDE 107 01/03/2025 1009    CO2 25 01/03/2025 1009    ANION 10.0 01/03/2025 1009    GLUCOSE 158 (H) 01/03/2025 1009    BUN 11 01/03/2025 1009    CREATININE 0.82 01/03/2025 1009    GFRCKD 117 01/03/2025 1009    CALCIUM 8.9 01/03/2025 1009    ASTSGOT 19 05/10/2012 0209    ALTSGPT 32 05/10/2012 0209    ALKPHOSPHAT 58 05/10/2012 0209    TBILIRUBIN 0.4 05/10/2012 0209    ALBUMIN 3.5 05/10/2012 0209    TOTPROTEIN 6.0 05/10/2012 0209    GLOBULIN 2.5 05/10/2012 0209    AGRATIO 1.4 05/10/2012 0209       Imaging:   See representative RUG images in HPI    Assessment: 34 y.o.male with a history of panurethral stricture secondary to lichen sclerosus, here  today for follow up after recent RUG and dilation. His stricture is worst in the meatus and fossa navicularis, but does extend through the penile and bulbar urethra, with an appearance on the RUG consistent with scar extending all the way to the bulbomembranous junction. We discussed lichen sclerosis and how this both can lead to urethral stricture and increases the risk of stricture recurrence after management.     I explained options for his stricture in detail:  -Continued self dilation/CIC  -SPT placement  -Perineal urethrostomy formation  -Urethral reconstruction, which in his case would require panurethral reconstruction. For cases like his, I would utilize a Sidney-style unilateral urethral mobilization and dorsolateral buccal graft augmentation. I explained the perineal approach, penile inversion, and method of urethral augmentation. We discussed in detail the short and long-term risks, including significant perioperative risks including positional injury and rhabdomyolysis, intermediate term risks such as urinary tract infection, hematuria, urinary extravasation with urinoma or abscess formation, urethrocutaneous fistula, and longer term risks including ejaculatory dysfunction and stricture recurrence.     He understood his options, and would like to proceed with urethral reconstruction. Ideally he'd like to do so at the end of March or beginning of April (end of softball season for him). Until that time, I would like him to work on improving the very narrow meatus and fossa navicularis to leave an improved urethral plate for reconstruction of this very distal location, and so I taught him today how to use a urethral meatal dilator to both dilate the area and apply betamethasone cream to the distal urethra.       Plan:    -Start daily meatal/fossa dilation with application of betamethasone; instructed in office today  -EMLA prescribed for glans pain; can apply prior to daily dilation  -Follow up in 3  weeks; if meatus and fossa sufficiently open will try to transition to daily CIC with a 12 or 14 Fr catheter to help treat length of anterior urethra with betamethasone  -Marvin will continue to work on weight loss; discussed importance of weight loss prior to surgery due to increased risk of LE rhabdomyolysis and positional nerve injuries  -Will schedule Sidney-style panurethral reconstruction with harvest of bilateral buccal mucosa graft and possibly lingual/labial graft for late March or April      Tom Borden MD

## 2025-02-10 ENCOUNTER — OFFICE VISIT (OUTPATIENT)
Dept: UROLOGY | Facility: MEDICAL CENTER | Age: 35
End: 2025-02-10
Payer: COMMERCIAL

## 2025-02-10 DIAGNOSIS — N35.914 ANTERIOR URETHRAL STRICTURE: ICD-10-CM

## 2025-02-10 DIAGNOSIS — L90.0 LICHEN SCLEROSUS: ICD-10-CM

## 2025-02-10 PROCEDURE — 99212 OFFICE O/P EST SF 10 MIN: CPT | Performed by: UROLOGY

## 2025-02-10 NOTE — PROGRESS NOTES
Chief Complaint: panurethral stricture, lichen sclerosis    HPI: Anival Brooks is a 34 y.o. male with a history of panurethral stricture secondary to lichen sclerosis, here today for follow up. He has been using a meatal dilator with betamethasone cream, and has noticed he's been able to pass it further into the urethra over the last week. He is voiding with a somewhat improved stream, as well.    No hematuria, dysuria, fevers, or chills.     He has lost 5 pounds and is committed to losing more before surgery. He is scheduled for panurethral reconstruction with bilateral BMG in April.     Past Medical History:  Past Medical History:   Diagnosis Date    Difficult or painful urination 01/03/2025    Hemorrhoid 01/03/2025    history of       Past Surgical History:  Past Surgical History:   Procedure Laterality Date    URETHROGRAM, RETROGRADE, DIAGNOSTIC N/A 1/10/2025    Procedure: RETROGRADE URETHROGRAM, URETHRAL DILATION;  Surgeon: Tom Borden M.D.;  Location: SURGERY [facility];  Service: Urology       Family History:  Family History   Problem Relation Age of Onset    Diabetes Father     Hypertension Father     Diabetes Sister     Stroke Neg Hx     Heart Disease Neg Hx     Cancer Neg Hx     Lung Disease Neg Hx        Social History:  Social History     Socioeconomic History    Marital status:      Spouse name: Not on file    Number of children: Not on file    Years of education: Not on file    Highest education level: Not on file   Occupational History    Not on file   Tobacco Use    Smoking status: Never     Passive exposure: Never    Smokeless tobacco: Never   Vaping Use    Vaping status: Never Used   Substance and Sexual Activity    Alcohol use: No    Drug use: No    Sexual activity: Yes     Partners: Female   Other Topics Concern    Not on file   Social History Narrative    Not on file     Social Drivers of Health     Financial Resource Strain: Not on file   Food Insecurity: Not on file    Transportation Needs: Not on file   Physical Activity: Not on file   Stress: Not on file   Social Connections: Not on file   Intimate Partner Violence: Not on file   Housing Stability: Not on file       Medications:  Current Outpatient Medications   Medication Sig Dispense Refill    lidocaine-prilocaine (EMLA) 2.5-2.5 % Cream Apply 1 Application topically as needed (daily prior to dilation procedure). 90 g 0    betamethasone valerate (VALISONE) 0.1 % Ointment Apply 1 g topically every day. Apply to the bladder catheter prior to insertion 45 g 3    tamsulosin (FLOMAX) 0.4 MG capsule Take 1 Capsule by mouth 1/2 hour after breakfast. 30 Capsule 11    betamethasone valerate (VALISONE) 0.1 % Ointment Apply 1 g topically 2 times a day. 45 g 0     No current facility-administered medications for this visit.       Allergies:  No Known Allergies    Review of Systems:  Constitutional: Negative for fever, chills and malaise/fatigue.   HENT: Negative for congestion.    Eyes: Negative for pain.   Respiratory: Negative for cough and shortness of breath.    Cardiovascular: Negative for leg swelling.   Gastrointestinal: Negative for nausea, vomiting, abdominal pain and diarrhea.   Genitourinary: Negative for dysuria and hematuria.   Skin: Negative for rash.   Neurological: Negative for dizziness, focal weakness and headaches.   Endo/Heme/Allergies: Does not bruise/bleed easily.   Psychiatric/Behavioral: Negative for depression.  The patient is not nervous/anxious.        Physical Exam:  There were no vitals filed for this visit.    GENERAL: well appearing, well nourished, NAD  RESP: respiratory effort normal  ABDOMEN: soft, nontender, nondistended, no masses or organomegaly  HERNIAS: no hernias found on exam  SKIN/LYMPH: normal coloration and turgor, no suspicious skin lesions noted  NEURO/PSYCH: alert, oriented, normal speech, no focal findings or movement disorder noted  EXTREMITIES: no pedal edema noted  GENITOURINARY: penis  circumcised, LS on glans, meatus stenotic, palpable penile urethral fibrosis     Data Review:    Labs:  POCT UA   Lab Results   Component Value Date/Time    POCCOLOR DARK YELLOW 12/11/2024 09:43 AM    POCAPPEAR SLIGHTLY CLOUDY 12/11/2024 09:43 AM    POCLEUKEST TRACE 12/11/2024 09:43 AM    POCNITRITE POSITIVE 12/11/2024 09:43 AM    POCUROBILIGE 0.2 12/11/2024 09:43 AM    POCPROTEIN NEG 12/11/2024 09:43 AM    POCURPH 6.5 12/11/2024 09:43 AM    POCBLOOD NEG 12/11/2024 09:43 AM    POCSPGRV 1.020 12/11/2024 09:43 AM    POCKETONES NEG 12/11/2024 09:43 AM    POCBILIRUBIN NEG 12/11/2024 09:43 AM    POCGLUCUA NEG 12/11/2024 09:43 AM      CBC   Lab Results   Component Value Date/Time    WBC 7.8 01/03/2025 1009    RBC 4.68 (L) 01/03/2025 1009    HEMOGLOBIN 14.1 01/03/2025 1009    HEMATOCRIT 42.4 01/03/2025 1009    MCV 90.6 01/03/2025 1009    MCH 30.1 01/03/2025 1009    MCHC 33.3 01/03/2025 1009    RDW 45.9 01/03/2025 1009    MPV 10.0 01/03/2025 1009    LYMPHOCYTES 30.40 01/03/2025 1009    LYMPHS 2.36 01/03/2025 1009    MONOCYTES 6.10 01/03/2025 1009    MONOS 0.47 01/03/2025 1009    EOSINOPHILS 1.50 01/03/2025 1009    EOS 0.12 01/03/2025 1009    BASOPHILS 0.40 01/03/2025 1009    BASO 0.03 01/03/2025 1009    NRBC 0.00 01/03/2025 1009       CMP   Lab Results   Component Value Date/Time    SODIUM 142 01/03/2025 1009    POTASSIUM 4.4 01/03/2025 1009    CHLORIDE 107 01/03/2025 1009    CO2 25 01/03/2025 1009    ANION 10.0 01/03/2025 1009    GLUCOSE 158 (H) 01/03/2025 1009    BUN 11 01/03/2025 1009    CREATININE 0.82 01/03/2025 1009    GFRCKD 117 01/03/2025 1009    CALCIUM 8.9 01/03/2025 1009    ASTSGOT 19 05/10/2012 0209    ALTSGPT 32 05/10/2012 0209    ALKPHOSPHAT 58 05/10/2012 0209    TBILIRUBIN 0.4 05/10/2012 0209    ALBUMIN 3.5 05/10/2012 0209    TOTPROTEIN 6.0 05/10/2012 0209    GLOBULIN 2.5 05/10/2012 0209    AGRATIO 1.4 05/10/2012 0209       Imaging:               Assessment: 34 y.o.male with a history of LS-related  panurethral stricture, scheduled for panurethral reconstruction with harvest of bilateral buccal mucosa graft. Currently treating distal urethra with meatal dilator and use of betamethasone cream, but will transition this week to use of a 12 Fr catheter to help apply the steroid cream to the length of the urethra.      Plan:    -Continue daily meatal dilation  -Start use of 12 Fr catheter for once-daily treatment of length of urethra with betamethasone  -Continue with diet and exercise for weight loss  -Scheduled for panurethral reconstruction with buccal mucosal graft on 4/17/2025    Tom Borden MD

## 2025-03-19 ENCOUNTER — APPOINTMENT (OUTPATIENT)
Dept: ADMISSIONS | Facility: MEDICAL CENTER | Age: 35
End: 2025-03-19
Attending: UROLOGY
Payer: COMMERCIAL

## 2025-03-27 ENCOUNTER — PRE-ADMISSION TESTING (OUTPATIENT)
Dept: ADMISSIONS | Facility: MEDICAL CENTER | Age: 35
DRG: 672 | End: 2025-03-27
Attending: UROLOGY
Payer: COMMERCIAL

## 2025-03-27 NOTE — PREADMIT AVS NOTE
Current Medications   Medication Instructions    betamethasone valerate (VALISONE) 0.1 % Ointment CONTINUE TAKING MEDICATION AS PRESCRIBED.

## 2025-04-10 ENCOUNTER — APPOINTMENT (OUTPATIENT)
Dept: ADMISSIONS | Facility: MEDICAL CENTER | Age: 35
DRG: 672 | End: 2025-04-10
Attending: UROLOGY
Payer: COMMERCIAL

## 2025-04-10 DIAGNOSIS — Z01.812 PRE-OPERATIVE LABORATORY EXAMINATION: ICD-10-CM

## 2025-04-10 LAB
ANION GAP SERPL CALC-SCNC: 10 MMOL/L (ref 7–16)
APPEARANCE UR: CLEAR
APTT PPP: 28.8 SEC (ref 24.7–36)
BASOPHILS # BLD AUTO: 0.6 % (ref 0–1.8)
BASOPHILS # BLD: 0.06 K/UL (ref 0–0.12)
BILIRUB UR QL STRIP.AUTO: NEGATIVE
BUN SERPL-MCNC: 9 MG/DL (ref 8–22)
CALCIUM SERPL-MCNC: 8.9 MG/DL (ref 8.5–10.5)
CHLORIDE SERPL-SCNC: 102 MMOL/L (ref 96–112)
CO2 SERPL-SCNC: 27 MMOL/L (ref 20–33)
COLOR UR: YELLOW
CREAT SERPL-MCNC: 0.84 MG/DL (ref 0.5–1.4)
EOSINOPHIL # BLD AUTO: 0.16 K/UL (ref 0–0.51)
EOSINOPHIL NFR BLD: 1.6 % (ref 0–6.9)
ERYTHROCYTE [DISTWIDTH] IN BLOOD BY AUTOMATED COUNT: 47.3 FL (ref 35.9–50)
GFR SERPLBLD CREATININE-BSD FMLA CKD-EPI: 116 ML/MIN/1.73 M 2
GLUCOSE SERPL-MCNC: 157 MG/DL (ref 65–99)
GLUCOSE UR STRIP.AUTO-MCNC: NEGATIVE MG/DL
HCT VFR BLD AUTO: 43 % (ref 42–52)
HGB BLD-MCNC: 14.3 G/DL (ref 14–18)
IMM GRANULOCYTES # BLD AUTO: 0.03 K/UL (ref 0–0.11)
IMM GRANULOCYTES NFR BLD AUTO: 0.3 % (ref 0–0.9)
INR PPP: 1.09 (ref 0.87–1.13)
KETONES UR STRIP.AUTO-MCNC: NEGATIVE MG/DL
LEUKOCYTE ESTERASE UR QL STRIP.AUTO: NEGATIVE
LYMPHOCYTES # BLD AUTO: 3.25 K/UL (ref 1–4.8)
LYMPHOCYTES NFR BLD: 33 % (ref 22–41)
MCH RBC QN AUTO: 30 PG (ref 27–33)
MCHC RBC AUTO-ENTMCNC: 33.3 G/DL (ref 32.3–36.5)
MCV RBC AUTO: 90.1 FL (ref 81.4–97.8)
MICRO URNS: NORMAL
MONOCYTES # BLD AUTO: 0.47 K/UL (ref 0–0.85)
MONOCYTES NFR BLD AUTO: 4.8 % (ref 0–13.4)
NEUTROPHILS # BLD AUTO: 5.87 K/UL (ref 1.82–7.42)
NEUTROPHILS NFR BLD: 59.7 % (ref 44–72)
NITRITE UR QL STRIP.AUTO: NEGATIVE
NRBC # BLD AUTO: 0 K/UL
NRBC BLD-RTO: 0 /100 WBC (ref 0–0.2)
PH UR STRIP.AUTO: 5.5 [PH] (ref 5–8)
PLATELET # BLD AUTO: 312 K/UL (ref 164–446)
PMV BLD AUTO: 10.5 FL (ref 9–12.9)
POTASSIUM SERPL-SCNC: 3.9 MMOL/L (ref 3.6–5.5)
PROT UR QL STRIP: NEGATIVE MG/DL
PROTHROMBIN TIME: 14.1 SEC (ref 12–14.6)
RBC # BLD AUTO: 4.77 M/UL (ref 4.7–6.1)
RBC UR QL AUTO: NEGATIVE
SODIUM SERPL-SCNC: 139 MMOL/L (ref 135–145)
SP GR UR STRIP.AUTO: 1.02
UROBILINOGEN UR STRIP.AUTO-MCNC: 1 EU/DL
WBC # BLD AUTO: 9.8 K/UL (ref 4.8–10.8)

## 2025-04-10 PROCEDURE — 36415 COLL VENOUS BLD VENIPUNCTURE: CPT

## 2025-04-10 PROCEDURE — 85025 COMPLETE CBC W/AUTO DIFF WBC: CPT

## 2025-04-10 PROCEDURE — 85610 PROTHROMBIN TIME: CPT

## 2025-04-10 PROCEDURE — 81003 URINALYSIS AUTO W/O SCOPE: CPT

## 2025-04-10 PROCEDURE — 85730 THROMBOPLASTIN TIME PARTIAL: CPT

## 2025-04-10 PROCEDURE — 87086 URINE CULTURE/COLONY COUNT: CPT

## 2025-04-10 PROCEDURE — 80048 BASIC METABOLIC PNL TOTAL CA: CPT

## 2025-04-12 LAB
BACTERIA UR CULT: NORMAL
SIGNIFICANT IND 70042: NORMAL
SITE SITE: NORMAL
SOURCE SOURCE: NORMAL

## 2025-04-16 ENCOUNTER — TELEPHONE (OUTPATIENT)
Dept: UROLOGY | Facility: MEDICAL CENTER | Age: 35
End: 2025-04-16
Payer: COMMERCIAL

## 2025-04-16 ENCOUNTER — ANESTHESIA EVENT (OUTPATIENT)
Dept: SURGERY | Facility: MEDICAL CENTER | Age: 35
DRG: 672 | End: 2025-04-16
Payer: COMMERCIAL

## 2025-04-17 ENCOUNTER — ANESTHESIA (OUTPATIENT)
Dept: SURGERY | Facility: MEDICAL CENTER | Age: 35
DRG: 672 | End: 2025-04-17
Payer: COMMERCIAL

## 2025-04-17 ENCOUNTER — HOSPITAL ENCOUNTER (INPATIENT)
Facility: MEDICAL CENTER | Age: 35
LOS: 2 days | DRG: 672 | End: 2025-04-19
Attending: UROLOGY | Admitting: UROLOGY
Payer: COMMERCIAL

## 2025-04-17 DIAGNOSIS — N35.914 ANTERIOR URETHRAL STRICTURE: ICD-10-CM

## 2025-04-17 LAB
ABO + RH BLD: NORMAL
ABO GROUP BLD: NORMAL
ANION GAP SERPL CALC-SCNC: 12 MMOL/L (ref 7–16)
BLD GP AB SCN SERPL QL: NORMAL
BUN SERPL-MCNC: 16 MG/DL (ref 8–22)
CALCIUM SERPL-MCNC: 8.5 MG/DL (ref 8.5–10.5)
CHLORIDE SERPL-SCNC: 105 MMOL/L (ref 96–112)
CO2 SERPL-SCNC: 20 MMOL/L (ref 20–33)
CREAT SERPL-MCNC: 0.98 MG/DL (ref 0.5–1.4)
ERYTHROCYTE [DISTWIDTH] IN BLOOD BY AUTOMATED COUNT: 46.7 FL (ref 35.9–50)
GFR SERPLBLD CREATININE-BSD FMLA CKD-EPI: 102 ML/MIN/1.73 M 2
GLUCOSE SERPL-MCNC: 179 MG/DL (ref 65–99)
HCT VFR BLD AUTO: 42.2 % (ref 42–52)
HGB BLD-MCNC: 13.9 G/DL (ref 14–18)
MCH RBC QN AUTO: 29.5 PG (ref 27–33)
MCHC RBC AUTO-ENTMCNC: 32.9 G/DL (ref 32.3–36.5)
MCV RBC AUTO: 89.6 FL (ref 81.4–97.8)
PLATELET # BLD AUTO: 353 K/UL (ref 164–446)
PMV BLD AUTO: 9.9 FL (ref 9–12.9)
POTASSIUM SERPL-SCNC: 4.9 MMOL/L (ref 3.6–5.5)
RBC # BLD AUTO: 4.71 M/UL (ref 4.7–6.1)
RH BLD: NORMAL
SODIUM SERPL-SCNC: 137 MMOL/L (ref 135–145)
WBC # BLD AUTO: 15.9 K/UL (ref 4.8–10.8)

## 2025-04-17 PROCEDURE — 700105 HCHG RX REV CODE 258: Performed by: UROLOGY

## 2025-04-17 PROCEDURE — C1729 CATH, DRAINAGE: HCPCS | Performed by: UROLOGY

## 2025-04-17 PROCEDURE — 160015 HCHG STAT PREOP MINUTES: Performed by: UROLOGY

## 2025-04-17 PROCEDURE — 160029 HCHG SURGERY MINUTES - 1ST 30 MINS LEVEL 4: Performed by: UROLOGY

## 2025-04-17 PROCEDURE — 85027 COMPLETE CBC AUTOMATED: CPT

## 2025-04-17 PROCEDURE — 160048 HCHG OR STATISTICAL LEVEL 1-5: Performed by: UROLOGY

## 2025-04-17 PROCEDURE — 160009 HCHG ANES TIME/MIN: Performed by: UROLOGY

## 2025-04-17 PROCEDURE — 700102 HCHG RX REV CODE 250 W/ 637 OVERRIDE(OP): Performed by: UROLOGY

## 2025-04-17 PROCEDURE — 86900 BLOOD TYPING SEROLOGIC ABO: CPT | Mod: 91

## 2025-04-17 PROCEDURE — 160041 HCHG SURGERY MINUTES - EA ADDL 1 MIN LEVEL 4: Performed by: UROLOGY

## 2025-04-17 PROCEDURE — 86901 BLOOD TYPING SEROLOGIC RH(D): CPT | Mod: 91

## 2025-04-17 PROCEDURE — 0CB4XZZ EXCISION OF BUCCAL MUCOSA, EXTERNAL APPROACH: ICD-10-PCS | Performed by: UROLOGY

## 2025-04-17 PROCEDURE — 160002 HCHG RECOVERY MINUTES (STAT): Performed by: UROLOGY

## 2025-04-17 PROCEDURE — 700111 HCHG RX REV CODE 636 W/ 250 OVERRIDE (IP): Performed by: UROLOGY

## 2025-04-17 PROCEDURE — A9270 NON-COVERED ITEM OR SERVICE: HCPCS | Performed by: UROLOGY

## 2025-04-17 PROCEDURE — 700101 HCHG RX REV CODE 250: Performed by: ANESTHESIOLOGY

## 2025-04-17 PROCEDURE — 0CB7XZZ EXCISION OF TONGUE, EXTERNAL APPROACH: ICD-10-PCS | Performed by: UROLOGY

## 2025-04-17 PROCEDURE — 0TJB8ZZ INSPECTION OF BLADDER, VIA NATURAL OR ARTIFICIAL OPENING ENDOSCOPIC: ICD-10-PCS | Performed by: UROLOGY

## 2025-04-17 PROCEDURE — 770001 HCHG ROOM/CARE - MED/SURG/GYN PRIV*

## 2025-04-17 PROCEDURE — 110371 HCHG SHELL REV 272: Performed by: UROLOGY

## 2025-04-17 PROCEDURE — 86850 RBC ANTIBODY SCREEN: CPT

## 2025-04-17 PROCEDURE — 700101 HCHG RX REV CODE 250: Performed by: UROLOGY

## 2025-04-17 PROCEDURE — 0TUD07Z SUPPLEMENT URETHRA WITH AUTOLOGOUS TISSUE SUBSTITUTE, OPEN APPROACH: ICD-10-PCS | Performed by: UROLOGY

## 2025-04-17 PROCEDURE — A9270 NON-COVERED ITEM OR SERVICE: HCPCS | Performed by: ANESTHESIOLOGY

## 2025-04-17 PROCEDURE — 700111 HCHG RX REV CODE 636 W/ 250 OVERRIDE (IP): Mod: JZ | Performed by: ANESTHESIOLOGY

## 2025-04-17 PROCEDURE — 160035 HCHG PACU - 1ST 60 MINS PHASE I: Performed by: UROLOGY

## 2025-04-17 PROCEDURE — 36415 COLL VENOUS BLD VENIPUNCTURE: CPT

## 2025-04-17 PROCEDURE — 700102 HCHG RX REV CODE 250 W/ 637 OVERRIDE(OP): Performed by: ANESTHESIOLOGY

## 2025-04-17 PROCEDURE — 80048 BASIC METABOLIC PNL TOTAL CA: CPT

## 2025-04-17 RX ORDER — SODIUM CHLORIDE 450 MG/100ML
INJECTION, SOLUTION INTRAVENOUS CONTINUOUS
Status: DISCONTINUED | OUTPATIENT
Start: 2025-04-17 | End: 2025-04-17

## 2025-04-17 RX ORDER — MAGNESIUM SULFATE HEPTAHYDRATE 40 MG/ML
INJECTION, SOLUTION INTRAVENOUS PRN
Status: DISCONTINUED | OUTPATIENT
Start: 2025-04-17 | End: 2025-04-17 | Stop reason: SURG

## 2025-04-17 RX ORDER — ALBUTEROL SULFATE 5 MG/ML
2.5 SOLUTION RESPIRATORY (INHALATION)
Status: DISCONTINUED | OUTPATIENT
Start: 2025-04-17 | End: 2025-04-17 | Stop reason: HOSPADM

## 2025-04-17 RX ORDER — HYDROMORPHONE HYDROCHLORIDE 2 MG/ML
INJECTION, SOLUTION INTRAMUSCULAR; INTRAVENOUS; SUBCUTANEOUS PRN
Status: DISCONTINUED | OUTPATIENT
Start: 2025-04-17 | End: 2025-04-17 | Stop reason: SURG

## 2025-04-17 RX ORDER — LIDOCAINE HYDROCHLORIDE 20 MG/ML
INJECTION, SOLUTION EPIDURAL; INFILTRATION; INTRACAUDAL; PERINEURAL PRN
Status: DISCONTINUED | OUTPATIENT
Start: 2025-04-17 | End: 2025-04-17 | Stop reason: SURG

## 2025-04-17 RX ORDER — POLYETHYLENE GLYCOL 3350 17 G/17G
1 POWDER, FOR SOLUTION ORAL
Status: DISCONTINUED | OUTPATIENT
Start: 2025-04-17 | End: 2025-04-19 | Stop reason: HOSPADM

## 2025-04-17 RX ORDER — CELECOXIB 200 MG/1
200 CAPSULE ORAL ONCE
Status: COMPLETED | OUTPATIENT
Start: 2025-04-17 | End: 2025-04-17

## 2025-04-17 RX ORDER — ROCURONIUM BROMIDE 10 MG/ML
INJECTION, SOLUTION INTRAVENOUS PRN
Status: DISCONTINUED | OUTPATIENT
Start: 2025-04-17 | End: 2025-04-17 | Stop reason: SURG

## 2025-04-17 RX ORDER — LIDOCAINE HYDROCHLORIDE 40 MG/ML
SOLUTION TOPICAL PRN
Status: DISCONTINUED | OUTPATIENT
Start: 2025-04-17 | End: 2025-04-17 | Stop reason: SURG

## 2025-04-17 RX ORDER — OXYCODONE HCL 5 MG/5 ML
10 SOLUTION, ORAL ORAL
Status: DISCONTINUED | OUTPATIENT
Start: 2025-04-17 | End: 2025-04-17 | Stop reason: HOSPADM

## 2025-04-17 RX ORDER — ONDANSETRON 2 MG/ML
INJECTION INTRAMUSCULAR; INTRAVENOUS PRN
Status: DISCONTINUED | OUTPATIENT
Start: 2025-04-17 | End: 2025-04-17 | Stop reason: SURG

## 2025-04-17 RX ORDER — DEXAMETHASONE SODIUM PHOSPHATE 4 MG/ML
INJECTION, SOLUTION INTRA-ARTICULAR; INTRALESIONAL; INTRAMUSCULAR; INTRAVENOUS; SOFT TISSUE PRN
Status: DISCONTINUED | OUTPATIENT
Start: 2025-04-17 | End: 2025-04-17 | Stop reason: SURG

## 2025-04-17 RX ORDER — OXYCODONE HYDROCHLORIDE 5 MG/1
5 TABLET ORAL
Refills: 0 | Status: DISCONTINUED | OUTPATIENT
Start: 2025-04-17 | End: 2025-04-19 | Stop reason: HOSPADM

## 2025-04-17 RX ORDER — ETHYL ALCOHOL 62 %
1 SWAB, MEDICATED TOPICAL 2 TIMES DAILY
Status: DISCONTINUED | OUTPATIENT
Start: 2025-04-17 | End: 2025-04-19 | Stop reason: HOSPADM

## 2025-04-17 RX ORDER — SUCCINYLCHOLINE CHLORIDE 20 MG/ML
INJECTION INTRAMUSCULAR; INTRAVENOUS PRN
Status: DISCONTINUED | OUTPATIENT
Start: 2025-04-17 | End: 2025-04-17 | Stop reason: SURG

## 2025-04-17 RX ORDER — ACETAMINOPHEN 325 MG/1
650 TABLET ORAL EVERY 6 HOURS PRN
Status: DISCONTINUED | OUTPATIENT
Start: 2025-04-17 | End: 2025-04-19 | Stop reason: HOSPADM

## 2025-04-17 RX ORDER — SODIUM CHLORIDE, SODIUM LACTATE, POTASSIUM CHLORIDE, CALCIUM CHLORIDE 600; 310; 30; 20 MG/100ML; MG/100ML; MG/100ML; MG/100ML
INJECTION, SOLUTION INTRAVENOUS CONTINUOUS
Status: DISCONTINUED | OUTPATIENT
Start: 2025-04-17 | End: 2025-04-17 | Stop reason: HOSPADM

## 2025-04-17 RX ORDER — CHLORHEXIDINE GLUCONATE ORAL RINSE 1.2 MG/ML
30 SOLUTION DENTAL 4 TIMES DAILY
Status: DISCONTINUED | OUTPATIENT
Start: 2025-04-17 | End: 2025-04-19 | Stop reason: HOSPADM

## 2025-04-17 RX ORDER — HYDROMORPHONE HYDROCHLORIDE 1 MG/ML
0.1 INJECTION, SOLUTION INTRAMUSCULAR; INTRAVENOUS; SUBCUTANEOUS
Status: DISCONTINUED | OUTPATIENT
Start: 2025-04-17 | End: 2025-04-17 | Stop reason: HOSPADM

## 2025-04-17 RX ORDER — HALOPERIDOL 5 MG/ML
1 INJECTION INTRAMUSCULAR
Status: DISCONTINUED | OUTPATIENT
Start: 2025-04-17 | End: 2025-04-17 | Stop reason: HOSPADM

## 2025-04-17 RX ORDER — BACITRACIN ZINC 500 [USP'U]/G
OINTMENT TOPICAL
Status: DISCONTINUED | OUTPATIENT
Start: 2025-04-17 | End: 2025-04-17 | Stop reason: HOSPADM

## 2025-04-17 RX ORDER — KETOROLAC TROMETHAMINE 15 MG/ML
15 INJECTION, SOLUTION INTRAMUSCULAR; INTRAVENOUS EVERY 6 HOURS PRN
Status: DISCONTINUED | OUTPATIENT
Start: 2025-04-17 | End: 2025-04-19 | Stop reason: HOSPADM

## 2025-04-17 RX ORDER — HYDROMORPHONE HYDROCHLORIDE 1 MG/ML
0.2 INJECTION, SOLUTION INTRAMUSCULAR; INTRAVENOUS; SUBCUTANEOUS
Status: DISCONTINUED | OUTPATIENT
Start: 2025-04-17 | End: 2025-04-17 | Stop reason: HOSPADM

## 2025-04-17 RX ORDER — DIPHENHYDRAMINE HYDROCHLORIDE 50 MG/ML
12.5 INJECTION, SOLUTION INTRAMUSCULAR; INTRAVENOUS
Status: DISCONTINUED | OUTPATIENT
Start: 2025-04-17 | End: 2025-04-17 | Stop reason: HOSPADM

## 2025-04-17 RX ORDER — AMOXICILLIN 250 MG
2 CAPSULE ORAL EVERY EVENING
Status: DISCONTINUED | OUTPATIENT
Start: 2025-04-17 | End: 2025-04-19 | Stop reason: HOSPADM

## 2025-04-17 RX ORDER — SODIUM CHLORIDE 9 MG/ML
INJECTION, SOLUTION INTRAVENOUS CONTINUOUS
Status: DISCONTINUED | OUTPATIENT
Start: 2025-04-17 | End: 2025-04-19 | Stop reason: HOSPADM

## 2025-04-17 RX ORDER — BUPIVACAINE HYDROCHLORIDE 5 MG/ML
INJECTION, SOLUTION EPIDURAL; INTRACAUDAL; PERINEURAL
Status: DISCONTINUED | OUTPATIENT
Start: 2025-04-17 | End: 2025-04-17 | Stop reason: HOSPADM

## 2025-04-17 RX ORDER — ONDANSETRON 2 MG/ML
4 INJECTION INTRAMUSCULAR; INTRAVENOUS
Status: DISCONTINUED | OUTPATIENT
Start: 2025-04-17 | End: 2025-04-17 | Stop reason: HOSPADM

## 2025-04-17 RX ORDER — ACETAMINOPHEN 500 MG
500-1000 TABLET ORAL EVERY 6 HOURS PRN
Status: ON HOLD | COMMUNITY
End: 2025-04-17

## 2025-04-17 RX ORDER — HYDROMORPHONE HYDROCHLORIDE 1 MG/ML
0.4 INJECTION, SOLUTION INTRAMUSCULAR; INTRAVENOUS; SUBCUTANEOUS
Status: DISCONTINUED | OUTPATIENT
Start: 2025-04-17 | End: 2025-04-17 | Stop reason: HOSPADM

## 2025-04-17 RX ORDER — MIDAZOLAM HYDROCHLORIDE 1 MG/ML
INJECTION INTRAMUSCULAR; INTRAVENOUS PRN
Status: DISCONTINUED | OUTPATIENT
Start: 2025-04-17 | End: 2025-04-17 | Stop reason: SURG

## 2025-04-17 RX ORDER — ACETAMINOPHEN 500 MG
1000 TABLET ORAL ONCE
Status: COMPLETED | OUTPATIENT
Start: 2025-04-17 | End: 2025-04-17

## 2025-04-17 RX ORDER — OXYCODONE HYDROCHLORIDE 5 MG/1
10 TABLET ORAL
Refills: 0 | Status: DISCONTINUED | OUTPATIENT
Start: 2025-04-17 | End: 2025-04-19 | Stop reason: HOSPADM

## 2025-04-17 RX ORDER — MEPERIDINE HYDROCHLORIDE 25 MG/ML
6.25 INJECTION INTRAMUSCULAR; INTRAVENOUS; SUBCUTANEOUS
Status: DISCONTINUED | OUTPATIENT
Start: 2025-04-17 | End: 2025-04-17 | Stop reason: HOSPADM

## 2025-04-17 RX ORDER — HEPARIN SODIUM 5000 [USP'U]/ML
5000 INJECTION, SOLUTION INTRAVENOUS; SUBCUTANEOUS EVERY 8 HOURS
Status: DISCONTINUED | OUTPATIENT
Start: 2025-04-17 | End: 2025-04-19 | Stop reason: HOSPADM

## 2025-04-17 RX ORDER — OXYCODONE HCL 5 MG/5 ML
5 SOLUTION, ORAL ORAL
Status: DISCONTINUED | OUTPATIENT
Start: 2025-04-17 | End: 2025-04-17 | Stop reason: HOSPADM

## 2025-04-17 RX ORDER — OXYCODONE HCL 10 MG/1
10 TABLET, FILM COATED, EXTENDED RELEASE ORAL ONCE
Status: COMPLETED | OUTPATIENT
Start: 2025-04-17 | End: 2025-04-17

## 2025-04-17 RX ORDER — HEPARIN SODIUM 5000 [USP'U]/ML
5000 INJECTION, SOLUTION INTRAVENOUS; SUBCUTANEOUS EVERY 8 HOURS
Status: DISCONTINUED | OUTPATIENT
Start: 2025-04-17 | End: 2025-04-17

## 2025-04-17 RX ORDER — HYDROMORPHONE HYDROCHLORIDE 1 MG/ML
0.5 INJECTION, SOLUTION INTRAMUSCULAR; INTRAVENOUS; SUBCUTANEOUS
Status: DISCONTINUED | OUTPATIENT
Start: 2025-04-17 | End: 2025-04-19 | Stop reason: HOSPADM

## 2025-04-17 RX ORDER — SODIUM CHLORIDE, SODIUM LACTATE, POTASSIUM CHLORIDE, CALCIUM CHLORIDE 600; 310; 30; 20 MG/100ML; MG/100ML; MG/100ML; MG/100ML
INJECTION, SOLUTION INTRAVENOUS CONTINUOUS
Status: ACTIVE | OUTPATIENT
Start: 2025-04-17 | End: 2025-04-17

## 2025-04-17 RX ADMIN — ROCURONIUM BROMIDE 10 MG: 10 INJECTION INTRAVENOUS at 12:11

## 2025-04-17 RX ADMIN — PIPERACILLIN AND TAZOBACTAM 3.38 G: 4; .5 INJECTION, POWDER, FOR SOLUTION INTRAVENOUS at 13:32

## 2025-04-17 RX ADMIN — CHLORHEXIDINE GLUCONATE, 0.12% ORAL RINSE 30 ML: 1.2 SOLUTION DENTAL at 20:13

## 2025-04-17 RX ADMIN — ROCURONIUM BROMIDE 20 MG: 10 INJECTION INTRAVENOUS at 09:09

## 2025-04-17 RX ADMIN — LIDOCAINE HYDROCHLORIDE 100 MG: 20 INJECTION, SOLUTION EPIDURAL; INFILTRATION; INTRACAUDAL; PERINEURAL at 07:29

## 2025-04-17 RX ADMIN — CHLORHEXIDINE GLUCONATE, 0.12% ORAL RINSE 30 ML: 1.2 SOLUTION DENTAL at 17:12

## 2025-04-17 RX ADMIN — MIDAZOLAM HYDROCHLORIDE 2 MG: 1 INJECTION, SOLUTION INTRAMUSCULAR; INTRAVENOUS at 07:24

## 2025-04-17 RX ADMIN — HYDROMORPHONE HYDROCHLORIDE 0.5 MG: 2 INJECTION INTRAMUSCULAR; INTRAVENOUS; SUBCUTANEOUS at 14:07

## 2025-04-17 RX ADMIN — SODIUM CHLORIDE: 9 INJECTION, SOLUTION INTRAVENOUS at 17:26

## 2025-04-17 RX ADMIN — ROCURONIUM BROMIDE 20 MG: 10 INJECTION INTRAVENOUS at 12:27

## 2025-04-17 RX ADMIN — ROCURONIUM BROMIDE 30 MG: 10 INJECTION INTRAVENOUS at 08:04

## 2025-04-17 RX ADMIN — HYDROMORPHONE HYDROCHLORIDE 0.5 MG: 2 INJECTION INTRAMUSCULAR; INTRAVENOUS; SUBCUTANEOUS at 14:18

## 2025-04-17 RX ADMIN — HYDROMORPHONE HYDROCHLORIDE 0.5 MG: 2 INJECTION INTRAMUSCULAR; INTRAVENOUS; SUBCUTANEOUS at 13:24

## 2025-04-17 RX ADMIN — Medication 1 APPLICATOR: at 20:13

## 2025-04-17 RX ADMIN — Medication 25 MG: at 09:46

## 2025-04-17 RX ADMIN — ONDANSETRON 4 MG: 2 INJECTION INTRAMUSCULAR; INTRAVENOUS at 14:02

## 2025-04-17 RX ADMIN — MAGNESIUM SULFATE HEPTAHYDRATE 2 G: 2 INJECTION, SOLUTION INTRAVENOUS at 09:04

## 2025-04-17 RX ADMIN — Medication 25 MG: at 10:47

## 2025-04-17 RX ADMIN — PIPERACILLIN AND TAZOBACTAM 4.5 G: 4; .5 INJECTION, POWDER, FOR SOLUTION INTRAVENOUS at 07:55

## 2025-04-17 RX ADMIN — FENTANYL CITRATE 50 MCG: 50 INJECTION, SOLUTION INTRAMUSCULAR; INTRAVENOUS at 07:24

## 2025-04-17 RX ADMIN — HEPARIN SODIUM 5000 UNITS: 5000 INJECTION, SOLUTION INTRAVENOUS; SUBCUTANEOUS at 17:13

## 2025-04-17 RX ADMIN — FENTANYL CITRATE 50 MCG: 50 INJECTION, SOLUTION INTRAMUSCULAR; INTRAVENOUS at 09:40

## 2025-04-17 RX ADMIN — ROCURONIUM BROMIDE 20 MG: 10 INJECTION INTRAVENOUS at 09:46

## 2025-04-17 RX ADMIN — ACETAMINOPHEN 1000 MG: 500 TABLET ORAL at 06:00

## 2025-04-17 RX ADMIN — PIPERACILLIN AND TAZOBACTAM 4.5 G: 4; .5 INJECTION, POWDER, FOR SOLUTION INTRAVENOUS at 17:22

## 2025-04-17 RX ADMIN — SUGAMMADEX 200 MG: 100 INJECTION, SOLUTION INTRAVENOUS at 14:25

## 2025-04-17 RX ADMIN — FENTANYL CITRATE 50 MCG: 50 INJECTION, SOLUTION INTRAMUSCULAR; INTRAVENOUS at 14:27

## 2025-04-17 RX ADMIN — CELECOXIB 200 MG: 200 CAPSULE ORAL at 06:00

## 2025-04-17 RX ADMIN — SUCCINYLCHOLINE CHLORIDE 180 MG: 20 INJECTION, SOLUTION INTRAMUSCULAR; INTRAVENOUS at 07:29

## 2025-04-17 RX ADMIN — SODIUM CHLORIDE, POTASSIUM CHLORIDE, SODIUM LACTATE AND CALCIUM CHLORIDE: 600; 310; 30; 20 INJECTION, SOLUTION INTRAVENOUS at 14:02

## 2025-04-17 RX ADMIN — PIPERACILLIN AND TAZOBACTAM 3.38 G: 4; .5 INJECTION, POWDER, FOR SOLUTION INTRAVENOUS at 11:34

## 2025-04-17 RX ADMIN — PROPOFOL 250 MG: 10 INJECTION, EMULSION INTRAVENOUS at 07:29

## 2025-04-17 RX ADMIN — FENTANYL CITRATE 50 MCG: 50 INJECTION, SOLUTION INTRAMUSCULAR; INTRAVENOUS at 08:38

## 2025-04-17 RX ADMIN — OXYCODONE 10 MG: 5 TABLET ORAL at 17:13

## 2025-04-17 RX ADMIN — ROCURONIUM BROMIDE 20 MG: 10 INJECTION INTRAVENOUS at 10:11

## 2025-04-17 RX ADMIN — LIDOCAINE HYDROCHLORIDE 4 ML: 40 SOLUTION TOPICAL at 07:31

## 2025-04-17 RX ADMIN — HEPARIN SODIUM 5000 UNITS: 5000 INJECTION, SOLUTION INTRAVENOUS; SUBCUTANEOUS at 07:43

## 2025-04-17 RX ADMIN — HYDROMORPHONE HYDROCHLORIDE 0.5 MG: 2 INJECTION INTRAMUSCULAR; INTRAVENOUS; SUBCUTANEOUS at 11:09

## 2025-04-17 RX ADMIN — SODIUM CHLORIDE, POTASSIUM CHLORIDE, SODIUM LACTATE AND CALCIUM CHLORIDE: 600; 310; 30; 20 INJECTION, SOLUTION INTRAVENOUS at 07:24

## 2025-04-17 RX ADMIN — FENTANYL CITRATE 50 MCG: 50 INJECTION, SOLUTION INTRAMUSCULAR; INTRAVENOUS at 08:03

## 2025-04-17 RX ADMIN — SENNOSIDES AND DOCUSATE SODIUM 2 TABLET: 50; 8.6 TABLET ORAL at 17:13

## 2025-04-17 RX ADMIN — OXYCODONE HYDROCHLORIDE 10 MG: 10 TABLET, FILM COATED, EXTENDED RELEASE ORAL at 06:00

## 2025-04-17 RX ADMIN — OXYCODONE 10 MG: 5 TABLET ORAL at 20:13

## 2025-04-17 RX ADMIN — DEXAMETHASONE SODIUM PHOSPHATE 8 MG: 4 INJECTION INTRA-ARTICULAR; INTRALESIONAL; INTRAMUSCULAR; INTRAVENOUS; SOFT TISSUE at 07:35

## 2025-04-17 RX ADMIN — SODIUM CHLORIDE: 9 INJECTION, SOLUTION INTRAVENOUS at 17:20

## 2025-04-17 SDOH — ECONOMIC STABILITY: TRANSPORTATION INSECURITY
IN THE PAST 12 MONTHS, HAS LACK OF RELIABLE TRANSPORTATION KEPT YOU FROM MEDICAL APPOINTMENTS, MEETINGS, WORK OR FROM GETTING THINGS NEEDED FOR DAILY LIVING?: NO

## 2025-04-17 SDOH — ECONOMIC STABILITY: TRANSPORTATION INSECURITY
IN THE PAST 12 MONTHS, HAS THE LACK OF TRANSPORTATION KEPT YOU FROM MEDICAL APPOINTMENTS OR FROM GETTING MEDICATIONS?: NO

## 2025-04-17 ASSESSMENT — ENCOUNTER SYMPTOMS
CHILLS: 0
VOMITING: 0
SENSORY CHANGE: 1
FEVER: 0
BLURRED VISION: 0
DOUBLE VISION: 0
MYALGIAS: 0
COUGH: 0
SHORTNESS OF BREATH: 0
NAUSEA: 0
ABDOMINAL PAIN: 0

## 2025-04-17 ASSESSMENT — LIFESTYLE VARIABLES
EVER FELT BAD OR GUILTY ABOUT YOUR DRINKING: NO
HAVE PEOPLE ANNOYED YOU BY CRITICIZING YOUR DRINKING: NO
ON A TYPICAL DAY WHEN YOU DRINK ALCOHOL HOW MANY DRINKS DO YOU HAVE: 0
CONSUMPTION TOTAL: NEGATIVE
TOTAL SCORE: 0
AVERAGE NUMBER OF DAYS PER WEEK YOU HAVE A DRINK CONTAINING ALCOHOL: 0
TOTAL SCORE: 0
TOTAL SCORE: 0
EVER HAD A DRINK FIRST THING IN THE MORNING TO STEADY YOUR NERVES TO GET RID OF A HANGOVER: NO
HAVE YOU EVER FELT YOU SHOULD CUT DOWN ON YOUR DRINKING: NO
HOW MANY TIMES IN THE PAST YEAR HAVE YOU HAD 5 OR MORE DRINKS IN A DAY: 0
ALCOHOL_USE: NO

## 2025-04-17 ASSESSMENT — PAIN DESCRIPTION - PAIN TYPE
TYPE: ACUTE PAIN;SURGICAL PAIN
TYPE: SURGICAL PAIN
TYPE: SURGICAL PAIN
TYPE: ACUTE PAIN

## 2025-04-17 ASSESSMENT — SOCIAL DETERMINANTS OF HEALTH (SDOH)
WITHIN THE LAST YEAR, HAVE TO BEEN RAPED OR FORCED TO HAVE ANY KIND OF SEXUAL ACTIVITY BY YOUR PARTNER OR EX-PARTNER?: NO
WITHIN THE PAST 12 MONTHS, YOU WORRIED THAT YOUR FOOD WOULD RUN OUT BEFORE YOU GOT THE MONEY TO BUY MORE: NEVER TRUE
IN THE PAST 12 MONTHS, HAS THE ELECTRIC, GAS, OIL, OR WATER COMPANY THREATENED TO SHUT OFF SERVICE IN YOUR HOME?: NO
WITHIN THE LAST YEAR, HAVE YOU BEEN KICKED, HIT, SLAPPED, OR OTHERWISE PHYSICALLY HURT BY YOUR PARTNER OR EX-PARTNER?: NO
WITHIN THE LAST YEAR, HAVE YOU BEEN AFRAID OF YOUR PARTNER OR EX-PARTNER?: NO
WITHIN THE PAST 12 MONTHS, THE FOOD YOU BOUGHT JUST DIDN'T LAST AND YOU DIDN'T HAVE MONEY TO GET MORE: NEVER TRUE
WITHIN THE LAST YEAR, HAVE YOU BEEN HUMILIATED OR EMOTIONALLY ABUSED IN OTHER WAYS BY YOUR PARTNER OR EX-PARTNER?: NO

## 2025-04-17 ASSESSMENT — PATIENT HEALTH QUESTIONNAIRE - PHQ9
SUM OF ALL RESPONSES TO PHQ9 QUESTIONS 1 AND 2: 0
2. FEELING DOWN, DEPRESSED, IRRITABLE, OR HOPELESS: NOT AT ALL
1. LITTLE INTEREST OR PLEASURE IN DOING THINGS: NOT AT ALL

## 2025-04-17 ASSESSMENT — PAIN SCALES - GENERAL: PAIN_LEVEL: 0

## 2025-04-17 ASSESSMENT — COGNITIVE AND FUNCTIONAL STATUS - GENERAL
SUGGESTED CMS G CODE MODIFIER MOBILITY: CH
MOBILITY SCORE: 24
DAILY ACTIVITIY SCORE: 23
SUGGESTED CMS G CODE MODIFIER DAILY ACTIVITY: CI
DRESSING REGULAR LOWER BODY CLOTHING: A LITTLE

## 2025-04-17 NOTE — PROGRESS NOTES
Pt admitted to room T430 via transport in hospital bed from PACU at 1553. Report received from GASPER Vera.      Patient reports pain at 8 on a scale of 0-10. Educated patient regarding pharmacologic and non pharmacologic modalities for pain management. Oriented to room call light and smoking policy.  Reviewed plan of care (equipment, incentive spirometer, sequential compression devices, medications, activity, diet, fall precautions, skin care, and pain) with patient and family. Welcome packet given and reviewed with patient, all questions answered. Education provided on oral hygiene program.     AA&Ox4. Denies CP/SOB.  See 2 RN skin note  Tolerating clear liquid diet. Denies N/V.  + void via gooden, blue in color - BM. Last BM 4/16 PTA, soft BM per patient  Pt ambulates x1    All needs met at this time. Call light within reach. Pt calls appropriately. Bed low and locked, non skid socks in place. Hourly rounding in place.

## 2025-04-17 NOTE — PROGRESS NOTES
Pharmacy Medication Reconciliation      ~Medication reconciliation updated and complete per patient   ~Allergies have been verified and updated   ~No oral ABX within the last 30 days  ~Is dispense history available in EPIC: yes  ~Patient home pharmacy :  [facility] [place] [facility] [phone]      ~Anticoagulants (rivaroxaban, apixaban, edoxaban, dabigatran, warfarin, enoxaparin) taken in the last 14 days? No

## 2025-04-17 NOTE — ANESTHESIA PREPROCEDURE EVALUATION
Case: [id number] Date/Time: 04/17/25 0715    Procedures:       ANTERIOR URETHRAL RECONSTRUCTION, HARVEST OF BUCCAL MUCOSA GRAFT FROM BILATERAL CHEEKS, CYSTOSCOPY      CYSTOSCOPY    Pre-op diagnosis: ANTERIOR URETHRAL STRICTURE    Location: [facility] OR 18 / SURGERY [facility]    Surgeons: Tom Borden M.D.          33 yo M. No current CP/SOB/N/V symptoms.      NPO  Relevant Problems   ANESTHESIA   (negative) History of anesthesia complications      Other   (positive) History of snoring       Physical Exam    Airway   Mallampati: III  TM distance: >3 FB  Neck ROM: full       Cardiovascular - normal exam  Rhythm: regular  Rate: normal  (-) murmur     Dental - normal exam        Facial Hair   Pulmonary - normal exam  Breath sounds clear to auscultation     Abdominal    Neurological - normal exam                   Anesthesia Plan    ASA 2       Plan - general       Airway plan will be ETT          Induction: intravenous    Postoperative Plan: Postoperative administration of opioids is intended.    Pertinent diagnostic labs and testing reviewed    Informed Consent:    Anesthetic plan and risks discussed with patient and spouse.    Use of blood products discussed with: patient and spouse whom consented to blood products.

## 2025-04-17 NOTE — OR SURGEON
Immediate Post OP Note    PreOp Diagnosis: Panurethral stricture, lichen sclerosus      PostOp Diagnosis: Same      Procedure(s):  ANTERIOR URETHRAL RECONSTRUCTION, HARVEST OF BUCCAL MUCOSA GRAFT FROM BILATERAL CHEEKS, LEFT LINGUAL GRAFT - Wound Class: Clean Contaminated  FLEXIBLE CYSTOSCOPY - Wound Class: None    Surgeon(s):  TESFAYE Petersen M.D.    Anesthesiologist/Type of Anesthesia:  Anesthesiologist: Joshua Booth M.D.; Radha Mccarthy M.D./General    Surgical Staff:  Circulator: Kalpana Sandhu R.N.  Relief Circulator: Edie Tripathi R.N.  Scrub Person: Jodie Schroeder; Anali Schultz; Kristen Hudson  Count [place]: Kavya Balbuena R.N.    Specimens removed if any:  * No specimens in log *    Estimated Blood Loss: 200 ml    Findings: Panurethral stricture extending from urethral meatus to bulbomembranous junction    Complications: None        4/17/2025 2:36 PM Tom Borden M.D.

## 2025-04-17 NOTE — PROGRESS NOTES
1433 Pt arrived from OR post surgery under anesthesia. Pt is rousable to voice. Cardiac rhythm appears to be SR.     1502: Pt awake; tolerating sips of water.     1526: Report to GASPER Mora.     1536: Pt to floor via bed with transport. On 4L; tank is over 300. Scant blood on mesh underwear; otherwise dressings are CDI.

## 2025-04-17 NOTE — ANESTHESIA POSTPROCEDURE EVALUATION
Patient: Anival Brooks    Procedure Summary       Date: 04/17/25 Room / Location: [place] [id number] / SURGERY [facility]    Anesthesia Start: 0724 Anesthesia Stop:     Procedures:       ANTERIOR URETHRAL RECONSTRUCTION, HARVEST OF BUCCAL MUCOSA GRAFT FROM BILATERAL CHEEKS, LEFT LINGUAL GRAFT (Urethra)      FLEXIBLE CYSTOSCOPY (Bladder) Diagnosis: (ANTERIOR URETHRAL STRICTURE)    Surgeons: Tom Borden M.D. Responsible Provider: Radha Mccarthy M.D.    Anesthesia Type: general ASA Status: 2            Final Anesthesia Type: general  Last vitals  BP   Blood Pressure: 127/83    Temp   36.4 °C (97.5 °F)    Pulse   78   Resp   18    SpO2   93 %      Anesthesia Post Evaluation    Patient location during evaluation: PACU  Patient participation: complete - patient participated  Level of consciousness: awake and alert  Pain score: 0    Airway patency: patent  Anesthetic complications: no  Cardiovascular status: hemodynamically stable  Respiratory status: acceptable  Hydration status: euvolemic    PONV: none          No notable events documented.

## 2025-04-17 NOTE — PROGRESS NOTES
4 Eyes Skin Assessment Completed by GASPER Mora and GASPER Chandra.    Head WDL  Ears WDL  Nose WDL  Mouth incisions  Neck WDL  Breast/Chest WDL  Shoulder Blades WDL  Spine WDL  (R) Arm/Elbow/Hand WDL  (L) Arm/Elbow/Hand WDL  Abdomen WDL  Groin Incision with DIP, KENNEY drain to scrotum  Scrotum/Coccyx/Buttocks WDL  (R) Leg scattered scratches  (L) Leg scattered scratches  (R) Heel/Foot/Toe WDL  (L) Heel/Foot/Toe WDL          Devices In Places Blood Pressure Cuff, Pulse Ox, Lopez, and Nasal Cannula      Interventions In Place Pillows and Heels Loaded W/Pillows    Possible Skin Injury No    Pictures Uploaded Into Epic N/A  Wound Consult Placed N/A  RN Wound Prevention Protocol Ordered No

## 2025-04-17 NOTE — H&P
Chief Complaint: urethral stricture    HPI: Anival Brooks is a 34 y.o. 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 He has been treated previously with urethral dilation, and since that time has been performing CIC with application of topical corticosteroid to the catheter, and has has no increased difficulty or pain. He stopped catheterizing a few days ago, and has no dysuria or hematuria. He feels very well overall.     Past Medical History:  Past Medical History:   Diagnosis Date    Difficult or painful urination 01/03/2025    Hemorrhoid 01/03/2025    history of    Urinary bladder disorder 03/27/2025    Straight Cath's Every PM       Past Surgical History:  Past Surgical History:   Procedure Laterality Date    URETHROGRAM, RETROGRADE, DIAGNOSTIC N/A 01/10/2025    Procedure: RETROGRADE URETHROGRAM, URETHRAL DILATION;  Surgeon: Tom Borden M.D.;  Location: SURGERY [facility];  Service: Urology    OTHER      Ear Tubes       Family History:  Family History   Problem Relation Age of Onset    Diabetes Father     Hypertension Father     Diabetes Sister     Stroke Neg Hx     Heart Disease Neg Hx     Cancer Neg Hx     Lung Disease Neg Hx        Social History:  Social History     Socioeconomic History    Marital status:      Spouse name: Not on file    Number of children: Not on file    Years of education: Not on file    Highest education level: Not on file   Occupational History    Not on file   Tobacco Use    Smoking status: Never     Passive exposure: Never    Smokeless tobacco: Never   Vaping Use    Vaping status: Never Used   Substance and Sexual Activity    Alcohol use: No    Drug use: No    Sexual activity: Yes     Partners: Female   Other Topics Concern    Not on file   Social History Narrative    Not on file     Social Drivers of Health     Financial Resource Strain: Not on file   Food Insecurity: Not on file    Transportation Needs: Not on file   Physical Activity: Not on file   Stress: Not on file   Social Connections: Not on file   Intimate Partner Violence: Not on file   Housing Stability: Not on file       Medications:  Current Facility-Administered Medications   Medication Dose Route Frequency Provider Last Rate Last Admin    lidocaine (Xylocaine) 1 % injection 0.5 mL  0.5 mL Intradermal Once PRN Tom Borden M.D.        lactated ringers infusion   Intravenous Continuous Tom Borden M.D.           Allergies:  No Known Allergies    Review of Systems:  Constitutional: Negative for fever, chills and malaise/fatigue.   HENT: Negative for congestion.    Eyes: Negative for pain.   Respiratory: Negative for cough and shortness of breath.    Cardiovascular: Negative for leg swelling.   Gastrointestinal: Negative for nausea, vomiting, abdominal pain and diarrhea.   Genitourinary: Negative for dysuria and hematuria.   Skin: Negative for rash.   Neurological: Negative for dizziness, focal weakness and headaches.   Endo/Heme/Allergies: Does not bruise/bleed easily.   Psychiatric/Behavioral: Negative for depression.  The patient is not nervous/anxious.        Physical Exam:  Vitals:    04/17/25 0541   BP: 121/85   Pulse: 60   Resp: 18   Temp: 36.4 °C (97.5 °F)   SpO2: 97%       GENERAL: well appearing, well nourished, NAD  RESP: respiratory effort normal  ABDOMEN: soft, nontender, nondistended, no masses or organomegaly  HERNIAS: no hernias found on exam  SKIN/LYMPH: normal coloration and turgor, no suspicious skin lesions noted  NEURO/PSYCH: alert, oriented, normal speech, no focal findings or movement disorder noted  EXTREMITIES: no pedal edema noted  GENITOURINARY: normal external genitalia aside from meatal stenosis and skin changes on glans consistent with lichen sclerosus      Data Review:    Labs:  POCT UA   Lab Results   Component Value Date/Time    POCCOLOR DARK YELLOW 12/11/2024 09:43 AM    POCAPPEAR  SLIGHTLY CLOUDY 12/11/2024 09:43 AM    POCLEUKEST TRACE 12/11/2024 09:43 AM    POCNITRITE POSITIVE 12/11/2024 09:43 AM    POCUROBILIGE 0.2 12/11/2024 09:43 AM    POCPROTEIN NEG 12/11/2024 09:43 AM    POCURPH 6.5 12/11/2024 09:43 AM    POCBLOOD NEG 12/11/2024 09:43 AM    POCSPGRV 1.020 12/11/2024 09:43 AM    POCKETONES NEG 12/11/2024 09:43 AM    POCBILIRUBIN NEG 12/11/2024 09:43 AM    POCGLUCUA NEG 12/11/2024 09:43 AM      CBC   Lab Results   Component Value Date/Time    WBC 9.8 04/10/2025 1529    RBC 4.77 04/10/2025 1529    HEMOGLOBIN 14.3 04/10/2025 1529    HEMATOCRIT 43.0 04/10/2025 1529    MCV 90.1 04/10/2025 1529    MCH 30.0 04/10/2025 1529    MCHC 33.3 04/10/2025 1529    RDW 47.3 04/10/2025 1529    MPV 10.5 04/10/2025 1529    LYMPHOCYTES 33.00 04/10/2025 1529    LYMPHS 3.25 04/10/2025 1529    MONOCYTES 4.80 04/10/2025 1529    MONOS 0.47 04/10/2025 1529    EOSINOPHILS 1.60 04/10/2025 1529    EOS 0.16 04/10/2025 1529    BASOPHILS 0.60 04/10/2025 1529    BASO 0.06 04/10/2025 1529    NRBC 0.00 04/10/2025 1529       CMP   Lab Results   Component Value Date/Time    SODIUM 139 04/10/2025 1529    POTASSIUM 3.9 04/10/2025 1529    CHLORIDE 102 04/10/2025 1529    CO2 27 04/10/2025 1529    ANION 10.0 04/10/2025 1529    GLUCOSE 157 (H) 04/10/2025 1529    BUN 9 04/10/2025 1529    CREATININE 0.84 04/10/2025 1529    GFRCKD 116 04/10/2025 1529    CALCIUM 8.9 04/10/2025 1529    ASTSGOT 19 05/10/2012 0209    ALTSGPT 32 05/10/2012 0209    ALKPHOSPHAT 58 05/10/2012 0209    TBILIRUBIN 0.4 05/10/2012 0209    ALBUMIN 3.5 05/10/2012 0209    TOTPROTEIN 6.0 05/10/2012 0209    GLOBULIN 2.5 05/10/2012 0209    AGRATIO 1.4 05/10/2012 0209         Assessment: 34 y.o. male with a history of lichen sclerosus related panurethral stricture, here today for urethral reconstruction with buccal mucosal grafting. He has stopped catheterizing a few days ago, and feels well without new urinary symptoms.     We again discussed in detail the approach  planned (penile inversion for panurethral reconstruction via Sidney technique), and I explained the minor and major risks of surgery and the expected recovery for both the urethral reconstruction and the buccal mucosa harvest. He has provided informed consent to proceed.       Plan:    To OR for panurethral reconstruction and harvest of bilateral buccal mucosa graft      Tom Borden MD

## 2025-04-17 NOTE — ANESTHESIA TIME REPORT
Anesthesia Start and Stop Event Times       Date Time Event    4/17/2025 0714 Ready for Procedure     0724 Anesthesia Start     1437 Anesthesia Stop          Responsible Staff  04/17/25      Name Role Begin End    Radha Mccarthy M.D. Anesth 0724 1435          Overtime Reason:  no overtime (within assigned shift)    Comments:

## 2025-04-17 NOTE — ANESTHESIA PROCEDURE NOTES
Airway    Date/Time: 4/17/2025 7:31 AM    Performed by: Radha Mccarthy M.D.  Authorized by: Radha Mccarthy M.D.    Location:  OR  Urgency:  Elective  Difficult Airway: No    Indications for Airway Management:  Anesthesia      Spontaneous Ventilation: absent    Sedation Level:  Deep  Preoxygenated: Yes    Patient Position:  Sniffing  Mask Difficulty Assessment:  2 - vent by mask + OA or adjuvant +/- NMBA  Final Airway Type:  Endotracheal airway  Final Endotracheal Airway:  ETT  Cuffed: Yes    Technique Used for Successful ETT Placement:  Direct laryngoscopy  Devices/Methods Used in Placement:  Intubating stylet    Insertion Site:  Oral  Blade Type:  Srinaht  Laryngoscope Blade/Videolaryngoscope Blade Size:  3  ETT Size (mm):  8.0  Measured from:  Teeth  ETT to Teeth (cm):  24  Placement Verified by: auscultation and capnometry    Cormack-Lehane Classification:  Grade IIa - partial view of glottis  Number of Attempts at Approach:  1

## 2025-04-17 NOTE — OP REPORT
Operative Report    Patient: Anival Brooks    Age: 34 y.o.    MRN: [id number]    Surgeon: Tom Borden MD    Assistant Surgeon: Marvin Alford MD (a qualified assistant was not available and a second surgeon was necessary to perform critical portions of the procedure)    Operation:  1. Panurethral reconstruction with dorsal oral mucosa graft; 2. [place] of buccal mucosa from the  bilateral  inner cheek and lingual graft from the left inferolateral tongue. 3. Flexible cystoscopy      Anesthesia:  General    EBL: 200 mL    Fluids: 1800 mL     Preoperative diagnosis: Panurethral stricture associated with lichen sclerosus     Postoperative diagnosis: Same     Operative Indications: The patient is a 34 y.o. male with a history of obstructive urinary symptoms found to have a severe panurethral stricture extending from the meatus to the bulbomembranous junction, and associated with lichen sclerosus.      Operative Procedure:  The patient was taken to the operating room and placed on the operating table in the supine position. He was given a dose of subcutaneous heparin, and sequential compression devices were placed on both calves. After induction of anesthesia, he was placed into a high lithotomy position with all pressure points adequately padded, and then prepped and draped in standard fashion. After administration of prophylactic antibiotics and performing a surgical time-out, we made a midline lower scrotal and perineal incision and carried it down through the soft tissues using electrocautery and then Metzenbaum scissors. The bulbospongiosus muscle was exposed after clearing more superficial adipose tissue. Here I began a unilateral urethral dissection by first transecting the left cavernosal portion of the bulbospongiosus muscle. The more proximal portion of the bulbospongiosus muscle was left fully intact. I then began to mobilize the urethra from the patient's left side, rotating the urethra towards his right but  leaving the neurovascular tissue on the right side intact. The urethra was mobilized beyond the midline, leaving nearly 2 cm of exposed corpus cavernosum to allow for future graft placement.     Once the urethral mobilization extended from the charo of the corpora cavernosa proximally to the distal bulbous urethra, I began the process of penile inversion. While holding downward pressure on the penis from above, I used tenotomy scissors to sharply dissect Kay's fascia on the left side of the corpus spongiosum. This allowed for full inversion of the penis until the entire length of the urethra was in the perineum. A Hoffman clamp was used to grasp glans tissue at the corona from within the perineum, and used to hold tension at various times during the case to keep the urethra straight within the perineum.     Once the penis was fully inverted, I continued the unilateral mobilization of the urethra distally through the length of the pendulous urethra and up into the fossa navicularis. When the urethral dissection was completed, the penis was briefly placed back in its orthotopic position. A 12 Fr [id number] was advanced through the meatus and down into the distal bulbous urethra. A stay suture was placed at the left lateral edge of the corpus spongiosum and used to rotate the urethra, exposing the dorsal midline. A fresh scalpel was used to made a midline dorsal urethrotomy onto the head of the [id number]. This dorsal urethrotomy was then continued with tenotomy scissors for the length of the anterior urethra, up into the fossa navicularis and proximal to the bulbomembranous junction. Once this was completed, I calibrated the proximal urethrotomy to a 30 Wallisian with the [id number].     At this time, we used a flexible cystoscope to enter the proximal end of the urethrotomy. The membranous urethra was healthy and free of evidence of lichen sclerosus. The verumontanum was very large, but the remainder of the  prostatic urethra was normal without obstructive hypertrophy. The bladder was normal without tumors, mucosal abnormalities, stones, or other foreign bodies. The cystoscope was removed.     Next, the penis was again placed in it's orthotopic position. A scalpel was used to create a wider space within the fossa navicularis and urethral meatus, beginning with a dorsal incision from with in the meatus and extended to include skin of the meatus. Incisions were made a bit lateral to midline within the fossa navicularis to creat sufficient space for our future graft. This incision continued on down to meet our dorsal urethrotomy and urethral mobilization that had been performed via the perineal incision.     We then lowered the legs and turned our attention to the patient's mouth and gained exposure on the left side in standard fashion. We identified Stensen's duct and stayed away from it. We marked out a graft 8 cm in  length and about 1.5 cm in width and infiltrated underneath it using 0.5% lidocaine with epinephrine. After waiting a bit, we made the peripheral incision and then elevated the graft off the underlying muscle using tenotomy scissors. Meticulous hemostasis was obtained at the donor site using bipolar cautery and the cheek was  packed. The graft was appropriately thinned and then brought into the operative field with the mucosal surface facing inward toward the lumen.  The same process was repeated on the patient's right side, again obtaining an 8 cm by 1.5 cm graft.     We then began to place our dorsal buccal mucosal grafts. Beginning at the urethral meatus, I used interrupted 4-0 PDS suture to anastomose the distal end of one graft to the meatus. The graft was then inserted into the urethra, and the penis again inverted into the perineum. From within the perineum, we then performed an anastomosis between the second graft and the proximal end of the urethrotomy in the distal membranous urethra. Again,  interrupted 4-0 PDS suture was used. When then placed the grafts along the corpus cavernosa while holding the penis on a moderate degree of stretch with the Kaur clamp, and found that there remained a 2.5 cm gap between the two grafts. There was no length of graft remaining in either cheek, and so I elected to harvest a lingual graft. The legs were again lowered, and we regained exposure to the mouth as we had prior. Stay sutures using 3-0 silk were used to retract the tongue. I marked out a 3 cm x 1.5 cm graft on the left inferolateral tongue. This area was infiltrated with 0.5% lidocaine with epinephrine, after which a scalpel was used to incise the edges and tenotomy scissors used to elevated the mucosa off of the underlying muscle. Hemostasis of the tongue was achieved with bipolar cautery.     After thinning a small amount of remaining muscular tissue off of the graft, it was brought back to the main surgical field. Oblique anastomoses were completed with interrupted suture between the lingual graft and the two buccal grafts. This allowed for full coverage of the anterior urethra without tension even while holding the penis on full stretch. I then began to quilt the grafts to the corpora cavernosa. This was completed with three running quilting sutures using 4-0 PDS, with one in the midline and two running quilting sutures near the lateral edges of the grafts.     Next, the anastomosis between the grafts and the right side of the urethrotomy was completed. This was done with running 4-0 PDS, beginning at the proximal end of the urethrotomy and continuing up into the fossa navicularis. This suture line incorporated the urethral mucosa, the cut edge of corpus spongiosum, and the buccal and lingual grafts.     The penis was then placed back in its orthotopic position. A 16 Setswana catheter was inserted through the meatus and down into the perineum. The penis was then inverted once again. The tip of the catheter  was then guided through the proximal anastomosis and into the bladder. The balloon was filled with 10 cc of sterile water. With the catheter in place, we then completed the left sided anastomosis. This was another running 4-0 PDS, which incorporated the cut edge of the corpus spongiosum, the left edge of the graft, and the left corpus cavernosum. This also began at the proximal end of the reconstruction and continued up into the fossa navicularis.    With the urethral reconstruction complete, the penis was everted back to its orthopic position. The catheter was secured to a drainage bag. I placed a 10 round KENNEY drain along the right side of the urethra, and looped under the bulb to also drain the left side of the urethra. The drain was brought out through the skin of the right groin and secured with a 2-0 nylon suture. We then irrigated the wound copiously and ensured good hemostasis with a combination of monopolar and bipolar cautery.     Finally, the wound was closed in layers. I used interrupted 3-0 vicryl to reapproximate the transected portion of the bulbospongiosus muscle as it inserted at the left corpus cavernosum. I then used more 3-0 vicryl to bring together adipose tissue and close dead space. Colles fascia  was closed using a running 3-0 vicryl and the skin using a running 4-0 vicryl. Bacitracin, telfa, and tegaderm dressing was applied over both the perineal wound and the drain site in the right groin. The patient was cleaned and dried and placed back into a supine position. He emerged from anesthesia and was taken to recovery room in stable condition.     Tom Borden MD

## 2025-04-18 DIAGNOSIS — N35.914 ANTERIOR URETHRAL STRICTURE: Primary | ICD-10-CM

## 2025-04-18 LAB
ANION GAP SERPL CALC-SCNC: 11 MMOL/L (ref 7–16)
BUN SERPL-MCNC: 11 MG/DL (ref 8–22)
CALCIUM SERPL-MCNC: 8.3 MG/DL (ref 8.5–10.5)
CHLORIDE SERPL-SCNC: 105 MMOL/L (ref 96–112)
CO2 SERPL-SCNC: 22 MMOL/L (ref 20–33)
CREAT SERPL-MCNC: 0.78 MG/DL (ref 0.5–1.4)
ERYTHROCYTE [DISTWIDTH] IN BLOOD BY AUTOMATED COUNT: 48 FL (ref 35.9–50)
GFR SERPLBLD CREATININE-BSD FMLA CKD-EPI: 118 ML/MIN/1.73 M 2
GLUCOSE SERPL-MCNC: 154 MG/DL (ref 65–99)
HCT VFR BLD AUTO: 37.7 % (ref 42–52)
HGB BLD-MCNC: 12.5 G/DL (ref 14–18)
MCH RBC QN AUTO: 29.9 PG (ref 27–33)
MCHC RBC AUTO-ENTMCNC: 33.2 G/DL (ref 32.3–36.5)
MCV RBC AUTO: 90.2 FL (ref 81.4–97.8)
PLATELET # BLD AUTO: 309 K/UL (ref 164–446)
PMV BLD AUTO: 9.9 FL (ref 9–12.9)
POTASSIUM SERPL-SCNC: 4.6 MMOL/L (ref 3.6–5.5)
RBC # BLD AUTO: 4.18 M/UL (ref 4.7–6.1)
SODIUM SERPL-SCNC: 138 MMOL/L (ref 135–145)
WBC # BLD AUTO: 14.5 K/UL (ref 4.8–10.8)

## 2025-04-18 PROCEDURE — 80048 BASIC METABOLIC PNL TOTAL CA: CPT

## 2025-04-18 PROCEDURE — 770001 HCHG ROOM/CARE - MED/SURG/GYN PRIV*

## 2025-04-18 PROCEDURE — 700105 HCHG RX REV CODE 258: Performed by: UROLOGY

## 2025-04-18 PROCEDURE — 700102 HCHG RX REV CODE 250 W/ 637 OVERRIDE(OP): Performed by: UROLOGY

## 2025-04-18 PROCEDURE — 85027 COMPLETE CBC AUTOMATED: CPT

## 2025-04-18 PROCEDURE — 700111 HCHG RX REV CODE 636 W/ 250 OVERRIDE (IP): Mod: JZ | Performed by: UROLOGY

## 2025-04-18 PROCEDURE — A9270 NON-COVERED ITEM OR SERVICE: HCPCS | Performed by: UROLOGY

## 2025-04-18 RX ADMIN — HEPARIN SODIUM 5000 UNITS: 5000 INJECTION, SOLUTION INTRAVENOUS; SUBCUTANEOUS at 20:14

## 2025-04-18 RX ADMIN — OXYCODONE 10 MG: 5 TABLET ORAL at 04:04

## 2025-04-18 RX ADMIN — SENNOSIDES AND DOCUSATE SODIUM 2 TABLET: 50; 8.6 TABLET ORAL at 17:13

## 2025-04-18 RX ADMIN — CHLORHEXIDINE GLUCONATE, 0.12% ORAL RINSE 30 ML: 1.2 SOLUTION DENTAL at 17:13

## 2025-04-18 RX ADMIN — PIPERACILLIN AND TAZOBACTAM 4.5 G: 4; .5 INJECTION, POWDER, FOR SOLUTION INTRAVENOUS at 04:02

## 2025-04-18 RX ADMIN — HEPARIN SODIUM 5000 UNITS: 5000 INJECTION, SOLUTION INTRAVENOUS; SUBCUTANEOUS at 04:06

## 2025-04-18 RX ADMIN — SODIUM CHLORIDE: 9 INJECTION, SOLUTION INTRAVENOUS at 22:12

## 2025-04-18 RX ADMIN — SODIUM CHLORIDE: 9 INJECTION, SOLUTION INTRAVENOUS at 09:13

## 2025-04-18 RX ADMIN — CHLORHEXIDINE GLUCONATE, 0.12% ORAL RINSE 30 ML: 1.2 SOLUTION DENTAL at 12:45

## 2025-04-18 RX ADMIN — CHLORHEXIDINE GLUCONATE, 0.12% ORAL RINSE 30 ML: 1.2 SOLUTION DENTAL at 20:14

## 2025-04-18 RX ADMIN — Medication 1 APPLICATOR: at 17:13

## 2025-04-18 RX ADMIN — OXYCODONE 10 MG: 5 TABLET ORAL at 20:14

## 2025-04-18 RX ADMIN — OXYCODONE 10 MG: 5 TABLET ORAL at 14:36

## 2025-04-18 RX ADMIN — PIPERACILLIN AND TAZOBACTAM 4.5 G: 4; .5 INJECTION, POWDER, FOR SOLUTION INTRAVENOUS at 12:46

## 2025-04-18 RX ADMIN — HEPARIN SODIUM 5000 UNITS: 5000 INJECTION, SOLUTION INTRAVENOUS; SUBCUTANEOUS at 14:36

## 2025-04-18 RX ADMIN — CHLORHEXIDINE GLUCONATE, 0.12% ORAL RINSE 30 ML: 1.2 SOLUTION DENTAL at 08:06

## 2025-04-18 RX ADMIN — OXYCODONE 10 MG: 5 TABLET ORAL at 08:06

## 2025-04-18 RX ADMIN — Medication 1 APPLICATOR: at 04:03

## 2025-04-18 ASSESSMENT — ENCOUNTER SYMPTOMS
FEVER: 0
SENSORY CHANGE: 0
SHORTNESS OF BREATH: 0
PALPITATIONS: 0
VOMITING: 0
HEARTBURN: 0
MYALGIAS: 0
NAUSEA: 0
ABDOMINAL PAIN: 0
SPEECH CHANGE: 0
CHILLS: 0

## 2025-04-18 ASSESSMENT — PAIN DESCRIPTION - PAIN TYPE
TYPE: ACUTE PAIN
TYPE: ACUTE PAIN
TYPE: ACUTE PAIN;SURGICAL PAIN
TYPE: ACUTE PAIN

## 2025-04-18 NOTE — PROGRESS NOTES
Surgical Progress Note/Postoperative Check    Author: Tom Borden M.D. Date & Time created: 2025   5:27 PM     Interval Events:  Seen and examined on floor; doing well since transfer from PACU. Pain well controlled, and he has no tongue or cheek pain. There has been some expected bleeding from the meatus, but none from the perineal wound.     No fevers, chills, nausea, vomiting, chest pain.     No lower extremity pain. He does have RUE tingling/parasthesias in the fingers.     Review of Systems   Constitutional:  Negative for chills and fever.   Eyes:  Negative for blurred vision and double vision.   Respiratory:  Negative for cough and shortness of breath.    Cardiovascular:  Negative for chest pain.   Gastrointestinal:  Negative for abdominal pain, nausea and vomiting.   Genitourinary:  Positive for hematuria.   Musculoskeletal:  Negative for joint pain and myalgias.   Neurological:  Positive for sensory change.     Hemodynamics:  Temp (24hrs), Av.3 °C (97.3 °F), Min:36.1 °C (96.9 °F), Max:36.4 °C (97.5 °F)  Temperature: 36.2 °C (97.2 °F)  Pulse  Av.3  Min: 60  Max: 96   Blood Pressure: (!) 124/95     Respiratory:    Respiration: 16, Pulse Oximetry: 89 %           Neuro:  GCS       Fluids:    Intake/Output Summary (Last 24 hours) at 2025 1727  Last data filed at 2025 1500  Gross per 24 hour   Intake 1800 ml   Output 200 ml   Net 1600 ml     Weight: (!) 139 kg (305 lb 8.9 oz)  Current Diet Order   Procedures    Diet Order Diet: Clear Liquid     Physical Exam  Constitutional:       General: He is not in acute distress.     Appearance: He is not toxic-appearing.   Cardiovascular:      Rate and Rhythm: Normal rate and regular rhythm.      Pulses: Normal pulses.   Pulmonary:      Effort: Pulmonary effort is normal.   Abdominal:      General: Abdomen is flat.      Palpations: Abdomen is soft.   Genitourinary:     Comments: Lopez draining clear yellow urine.  Some blood from around catheter  at meatus  Perineal wound c/d/I, covered in tegaderm dressing  Minimal SS output from KENNEY drain  Musculoskeletal:         General: No swelling or tenderness.      Right lower leg: No edema.      Left lower leg: No edema.   Neurological:      Mental Status: He is alert.       Labs:  Recent Results (from the past 24 hours)   ABO Rh Confirm    Collection Time: 04/17/25  5:50 AM   Result Value Ref Range    ABO Rh Confirm A POS    COD - Adult (Type and Screen)    Collection Time: 04/17/25  6:07 AM   Result Value Ref Range    ABO Grouping Only A     Rh Grouping Only POS     Antibody Screen-Cod NEG    CBC WITHOUT DIFFERENTIAL    Collection Time: 04/17/25  4:00 PM   Result Value Ref Range    WBC 15.9 (H) 4.8 - 10.8 K/uL    RBC 4.71 4.70 - 6.10 M/uL    Hemoglobin 13.9 (L) 14.0 - 18.0 g/dL    Hematocrit 42.2 42.0 - 52.0 %    MCV 89.6 81.4 - 97.8 fL    MCH 29.5 27.0 - 33.0 pg    MCHC 32.9 32.3 - 36.5 g/dL    RDW 46.7 35.9 - 50.0 fL    Platelet Count 353 164 - 446 K/uL    MPV 9.9 9.0 - 12.9 fL   Basic Metabolic Panel    Collection Time: 04/17/25  4:00 PM   Result Value Ref Range    Sodium 137 135 - 145 mmol/L    Potassium 4.9 3.6 - 5.5 mmol/L    Chloride 105 96 - 112 mmol/L    Co2 20 20 - 33 mmol/L    Glucose 179 (H) 65 - 99 mg/dL    Bun 16 8 - 22 mg/dL    Creatinine 0.98 0.50 - 1.40 mg/dL    Calcium 8.5 8.5 - 10.5 mg/dL    Anion Gap 12.0 7.0 - 16.0   ESTIMATED GFR    Collection Time: 04/17/25  4:00 PM   Result Value Ref Range    GFR (CKD-EPI) 102 >60 mL/min/1.73 m 2     Medical Decision Making, by Problem:  There are no active hospital problems to display for this patient.    Plan:  34 year old male POD0 s/p panurethral reconstruction with bilateral buccal and left lingual grafts. Doing very well postoperatively. Normal physical exam and unremarkable postop labs aside from minor leukocytosis.     -Ambulate this evening  -Continue SQH and SCD's for DVT prophylaxis  -Continue IVF at 125 cc/hr  -Peridex mouth wash, swish and spit,  q6hrs  -Clear liquid diet  -Will likely advance to soft diet tomorrow morning      Quality Measures:  Quality-Core Measures    Discussed patient condition with Family and RN

## 2025-04-18 NOTE — PROGRESS NOTES
Received report from previous shift RN  Assessment complete.  A&O x 4. Patient calls appropriately.  Patient ambulates with stand by assist.   Patient has 7/10 pain. Pain managed with prescribed medications.  Denies N&V. Tolerating clear liquid diet.  Surgical incision on scrotum.  + void via gooden, + flatus, last BM 4/16 PTA  Patient denies SOB.  SCD's refused.  Review plan with of care with patient. Call light and personal belongings with in reach. Hourly rounding in place. All needs met at this time.

## 2025-04-18 NOTE — PROGRESS NOTES
Report received from day shift RN, assumed Care.   Patient is AOx4, responds appropriately.      Pain controlled at this time.  Patient is tolerating clear liquid diet, denies nausea/vomiting. + flatus  Up stand by assist with steady gait.    Plan of care discussed, all questions answered.    Educated on use of call light and importance of calling before getting out of bed. Pt verbalizes understanding.    Call light and belongings within reach, treaded slipper socks on, bed in lowest locked position.  All needs met at this time.

## 2025-04-18 NOTE — PROGRESS NOTES
Surgical Progress Note    Author: Tom Borden M.D. Date & Time created: 2025   9:00 AM     Interval Events:  No acute events overnight. Marvin does have surgical site pain that is controlled with prn analgesia. No fevers or chills, nor chest pain, SOB, N/V, or abdominal pain.   He had one very high SBP reported last night, though he says this was taken from his leg and the cuff was too tight. It was asymptomatic and improved when BP was rechecked on his left arm.   No lower extremity pain or paresthesia. RUE parasthesias have improved overnight.     Review of Systems   Constitutional:  Negative for chills and fever.   Respiratory:  Negative for shortness of breath.    Cardiovascular:  Negative for chest pain and palpitations.   Gastrointestinal:  Negative for abdominal pain, heartburn, nausea and vomiting.   Genitourinary:  Negative for hematuria and urgency.   Musculoskeletal:  Negative for myalgias.   Neurological:  Negative for sensory change and speech change.     Hemodynamics:  Temp (24hrs), Av.4 °C (97.5 °F), Min:36.1 °C (96.9 °F), Max:36.7 °C (98.1 °F)  Temperature: 36.4 °C (97.5 °F)  Pulse  Av.5  Min: 60  Max: 96   Blood Pressure: 114/68     Respiratory:    Respiration: 16, Pulse Oximetry: 95 %        RUL Breath Sounds: Clear, RML Breath Sounds: Diminished, RLL Breath Sounds: Diminished, ANDREA Breath Sounds: Clear, LLL Breath Sounds: Diminished  Neuro:  GCS       Fluids:    Intake/Output Summary (Last 24 hours) at 2025 0900  Last data filed at 2025 0800  Gross per 24 hour   Intake 2160 ml   Output 2980 ml   Net -820 ml        Current Diet Order   Procedures    Diet Order Diet: Clear Liquid     Physical Exam  Constitutional:       General: He is not in acute distress.     Appearance: Normal appearance. He is not ill-appearing.   Cardiovascular:      Rate and Rhythm: Normal rate.      Pulses: Normal pulses.   Pulmonary:      Effort: Pulmonary effort is normal. No respiratory  distress.   Abdominal:      General: Abdomen is flat.      Palpations: Abdomen is soft.   Genitourinary:     Comments: Normal external genitalia  Lopez catheter draining clear yellow urine  Some mild venous bleeding from urethral meatus  KENNEY drain with thin serosanguinous output    Musculoskeletal:         General: No swelling or tenderness.      Right lower leg: No edema.      Left lower leg: No edema.   Neurological:      General: No focal deficit present.      Mental Status: He is alert and oriented to person, place, and time.       Labs:  Recent Results (from the past 24 hours)   CBC WITHOUT DIFFERENTIAL    Collection Time: 04/17/25  4:00 PM   Result Value Ref Range    WBC 15.9 (H) 4.8 - 10.8 K/uL    RBC 4.71 4.70 - 6.10 M/uL    Hemoglobin 13.9 (L) 14.0 - 18.0 g/dL    Hematocrit 42.2 42.0 - 52.0 %    MCV 89.6 81.4 - 97.8 fL    MCH 29.5 27.0 - 33.0 pg    MCHC 32.9 32.3 - 36.5 g/dL    RDW 46.7 35.9 - 50.0 fL    Platelet Count 353 164 - 446 K/uL    MPV 9.9 9.0 - 12.9 fL   Basic Metabolic Panel    Collection Time: 04/17/25  4:00 PM   Result Value Ref Range    Sodium 137 135 - 145 mmol/L    Potassium 4.9 3.6 - 5.5 mmol/L    Chloride 105 96 - 112 mmol/L    Co2 20 20 - 33 mmol/L    Glucose 179 (H) 65 - 99 mg/dL    Bun 16 8 - 22 mg/dL    Creatinine 0.98 0.50 - 1.40 mg/dL    Calcium 8.5 8.5 - 10.5 mg/dL    Anion Gap 12.0 7.0 - 16.0   ESTIMATED GFR    Collection Time: 04/17/25  4:00 PM   Result Value Ref Range    GFR (CKD-EPI) 102 >60 mL/min/1.73 m 2   CBC without Differential    Collection Time: 04/18/25  1:42 AM   Result Value Ref Range    WBC 14.5 (H) 4.8 - 10.8 K/uL    RBC 4.18 (L) 4.70 - 6.10 M/uL    Hemoglobin 12.5 (L) 14.0 - 18.0 g/dL    Hematocrit 37.7 (L) 42.0 - 52.0 %    MCV 90.2 81.4 - 97.8 fL    MCH 29.9 27.0 - 33.0 pg    MCHC 33.2 32.3 - 36.5 g/dL    RDW 48.0 35.9 - 50.0 fL    Platelet Count 309 164 - 446 K/uL    MPV 9.9 9.0 - 12.9 fL   Basic Metabolic Panel (BMP)    Collection Time: 04/18/25  1:42 AM   Result  Value Ref Range    Sodium 138 135 - 145 mmol/L    Potassium 4.6 3.6 - 5.5 mmol/L    Chloride 105 96 - 112 mmol/L    Co2 22 20 - 33 mmol/L    Glucose 154 (H) 65 - 99 mg/dL    Bun 11 8 - 22 mg/dL    Creatinine 0.78 0.50 - 1.40 mg/dL    Calcium 8.3 (L) 8.5 - 10.5 mg/dL    Anion Gap 11.0 7.0 - 16.0   ESTIMATED GFR    Collection Time: 04/18/25  1:42 AM   Result Value Ref Range    GFR (CKD-EPI) 118 >60 mL/min/1.73 m 2     Medical Decision Making, by Problem:  There are no active hospital problems to display for this patient.    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 Doing well this morning.     -Ambulate this morning  -Monitor KENNEY output  -PRN analgesia (acetaminophen, ketorolac, oxycodone)  -Advance to full liquid diet  -Continue pharmacologic and mechanical DVT prophylaxis  -Likely discharge home tomorrow; will re-evaluate KENNEY output before deciding on removal, and will discharge home with catheter for 4 weeks    Quality Measures:  Quality-Core Measures    Discussed patient condition with Patient

## 2025-04-18 NOTE — CARE PLAN
The patient is Stable - Low risk of patient condition declining or worsening    Shift Goals  Clinical Goals: pain mgmt  Patient Goals: pain mgmt, rest  Family Goals: updates on POC    Progress made toward(s) clinical / shift goals:    Problem: Knowledge Deficit - Standard  Goal: Patient and family/care givers will demonstrate understanding of plan of care, disease process/condition, diagnostic tests and medications  Outcome: Progressing   Educate patient on plan of care, and encourage pt to ask questions.

## 2025-04-19 VITALS
RESPIRATION RATE: 17 BRPM | TEMPERATURE: 98.2 F | HEART RATE: 68 BPM | WEIGHT: 305.56 LBS | OXYGEN SATURATION: 94 % | BODY MASS INDEX: 39.21 KG/M2 | DIASTOLIC BLOOD PRESSURE: 71 MMHG | HEIGHT: 74 IN | SYSTOLIC BLOOD PRESSURE: 117 MMHG

## 2025-04-19 PROBLEM — N35.914 ANTERIOR URETHRAL STRICTURE: Status: ACTIVE | Noted: 2025-04-19

## 2025-04-19 PROCEDURE — 700111 HCHG RX REV CODE 636 W/ 250 OVERRIDE (IP): Performed by: UROLOGY

## 2025-04-19 PROCEDURE — 700102 HCHG RX REV CODE 250 W/ 637 OVERRIDE(OP): Performed by: UROLOGY

## 2025-04-19 PROCEDURE — A9270 NON-COVERED ITEM OR SERVICE: HCPCS | Performed by: UROLOGY

## 2025-04-19 RX ORDER — CHLORHEXIDINE GLUCONATE ORAL RINSE 1.2 MG/ML
30 SOLUTION DENTAL 4 TIMES DAILY
Qty: 1680 ML | Refills: 0 | Status: SHIPPED | OUTPATIENT
Start: 2025-04-19 | End: 2025-05-03

## 2025-04-19 RX ORDER — ACETAMINOPHEN 325 MG/1
650 TABLET ORAL EVERY 6 HOURS PRN
Qty: 30 TABLET | Refills: 0 | Status: SHIPPED | OUTPATIENT
Start: 2025-04-19

## 2025-04-19 RX ORDER — OXYCODONE HYDROCHLORIDE 5 MG/1
5 TABLET ORAL EVERY 6 HOURS PRN
Qty: 20 TABLET | Refills: 0 | Status: SHIPPED | OUTPATIENT
Start: 2025-04-19 | End: 2025-04-22 | Stop reason: SDUPTHER

## 2025-04-19 RX ORDER — CELECOXIB 200 MG/1
200 CAPSULE ORAL 2 TIMES DAILY PRN
Qty: 60 CAPSULE | Refills: 1 | Status: SHIPPED | OUTPATIENT
Start: 2025-04-19

## 2025-04-19 RX ORDER — DOCUSATE SODIUM 100 MG/1
100 CAPSULE, LIQUID FILLED ORAL 2 TIMES DAILY
Qty: 60 CAPSULE | Refills: 0 | Status: SHIPPED | OUTPATIENT
Start: 2025-04-19

## 2025-04-19 RX ADMIN — OXYCODONE 10 MG: 5 TABLET ORAL at 04:07

## 2025-04-19 RX ADMIN — OXYCODONE 10 MG: 5 TABLET ORAL at 09:25

## 2025-04-19 RX ADMIN — CHLORHEXIDINE GLUCONATE, 0.12% ORAL RINSE 30 ML: 1.2 SOLUTION DENTAL at 09:25

## 2025-04-19 RX ADMIN — HEPARIN SODIUM 5000 UNITS: 5000 INJECTION, SOLUTION INTRAVENOUS; SUBCUTANEOUS at 04:05

## 2025-04-19 RX ADMIN — Medication 1 APPLICATOR: at 04:05

## 2025-04-19 ASSESSMENT — PAIN DESCRIPTION - PAIN TYPE
TYPE: ACUTE PAIN

## 2025-04-19 NOTE — THERAPY
Physical Therapy Contact Note    Patient Name: Anival Brooks  Age:  34 y.o., Sex:  male  Medical Record #: [id number]  Today's Date: 4/18/2025    PT consult received, per BSRN pt has been up self/walking laps; discussed role of acute PT evaluation in recovery with pt, he denies concerns or return to work questions or dc concerns; acute PT eval deferred at this time per pt request, please reconsult should condition change;     Nayeli LEGER, PT,  [phone]

## 2025-04-19 NOTE — DISCHARGE SUMMARY
Discharge Summary    CHIEF COMPLAINT ON ADMISSION  No chief complaint on file.      Reason for Admission  Anterior urethral stricture     Admission Date  4/17/2025    CODE STATUS  Full Code    HPI & HOSPITAL COURSE  This is a 34 y.o. male here with lichen sclerosus and an extensive panurethral stricture, admitted electively on Thursday April 17th for urethral reconstruction with harvest of bilateral buccal mucosa graft and left lingual graft. He was admitted postoperatively for standard postoperative care including analgesia, assistance with ambulation, catheter and drain care, and monitoring of physical exam.     He started to recover well on postoperative day one (April 18th) but continued to require IV analgesia. He was able to ambulate with minimal assistance, and to use the restroom unassisted. He was kept overnight for further pain control.     On postoperative day 2 (April 19th) he was doing well, tolerating a full liquid diet, and his pain was adequately controlled.  No notes on file    Therefore, he is discharged in good and stable condition to home with close outpatient follow-up.    The patient met 2-midnight criteria for an inpatient stay at the time of discharge.    Discharge Date  April 19th, 2025    FOLLOW UP ITEMS POST DISCHARGE  -Will follow up next week for evaluation of drain output and likely removal, then again at 4 weeks postoperatively for catheter removal.     DISCHARGE DIAGNOSES  Active Problems:    * No active hospital problems. *  Resolved Problems:    * No resolved hospital problems. *      FOLLOW UP  Future Appointments   Date Time Provider Department Center   5/8/2025  9:00 AM Tom Borden M.D. [facility] None     Tom Borden M.D.  [facility] [address] [place] [id number]  [phone]    Follow up in 1 week(s)  For wound re-check and drain removal      MEDICATIONS ON DISCHARGE     Medication List        ASK your doctor about these medications        Instructions    MULTIVITAMIN PO   Take 1 Packet by mouth every day. MVI Packet  Dose: 1 Packet     tamsulosin 0.4 MG capsule  Commonly known as: Flomax   Take 1 Capsule by mouth 1/2 hour after breakfast.  Dose: 0.4 mg              Allergies  No Known Allergies    DIET  Orders Placed This Encounter   Procedures    Diet Order Diet: Full Liquid     Standing Status:   Standing     Number of Occurrences:   1     Diet::   Full Liquid [11]       ACTIVITY  As tolerated.  20-lb lifting restriction    CONSULTATIONS  None    PROCEDURES  4/17/2025: Anterior urethral reconstruction with buccal mucosa graft; harvest of bilateral buccal mucosa and left lingual mucosa; cystoscopy    LABORATORY  Lab Results   Component Value Date    SODIUM 138 04/18/2025    POTASSIUM 4.6 04/18/2025    CHLORIDE 105 04/18/2025    CO2 22 04/18/2025    GLUCOSE 154 (H) 04/18/2025    BUN 11 04/18/2025    CREATININE 0.78 04/18/2025        Lab Results   Component Value Date    WBC 14.5 (H) 04/18/2025    HEMOGLOBIN 12.5 (L) 04/18/2025    HEMATOCRIT 37.7 (L) 04/18/2025    PLATELETCT 309 04/18/2025        Total time of the discharge process exceeds 25 minutes.

## 2025-04-19 NOTE — PROGRESS NOTES
Patient down to discharge lounge with transporter in wheelchair. Gooden and KENNEY drain education provided. Empty collection containers and extra gooden bags, statlock and cleaning supplies given to patient. All questions answered at this time. Patient verbalized understanding. All belongings with patient.

## 2025-04-19 NOTE — CARE PLAN
The patient is Stable - Low risk of patient condition declining or worsening    Shift Goals  Clinical Goals: pain mgmt, gooden care  Patient Goals: rest  Family Goals: updates on POC      Problem: Knowledge Deficit - Standard  Goal: Patient and family/care givers will demonstrate understanding of plan of care, disease process/condition, diagnostic tests and medications  Description: Target End Date:  1-3 days or as soon as patient condition allowsDocument in Patient Education1.  Patient and family/caregiver oriented to unit, equipment, visitation policy and means for communicating concern2.  Complete/review Learning Assessment3.  Assess knowledge level of disease process/condition, treatment plan, diagnostic tests and medications4.  Explain disease process/condition, treatment plan, diagnostic tests and medications  Outcome: Progressing     Progress made toward(s) clinical / shift goals:  Pt. Was educated on pharmacological and non-pharmacological modalities. Pt. Verbalized understanding. Pt. pain was medicated per MAR. Pt. Gooden and KENNEY drain were intermittently monitored. Pt. Rested intermittently throughout the shift.     Patient is not progressing towards the following goals:

## 2025-04-19 NOTE — PROGRESS NOTES
Received report from previous shift RN  Assessment complete.  A&O x 4 . Patient calls appropriately.  Patient ambulates with SBA.  .  Patient has 8 /10 pain. Pain managed with prescribed medications.  Denies N&V. Tolerating Ful diet.  Skin per flowsheets   + void via gooden, + flatus, -BM (4/16)   Patient denies SOB. Spo2>95% on RA   SCD's refused.  Patient is resting in bed.  Reviewed plan of care with patient. Call light and personal belongings with in reach. Hourly rounding in place. All needs met at this time.

## 2025-04-19 NOTE — CARE PLAN
The patient is Stable - Low risk of patient condition declining or worsening    Shift Goals  Clinical Goals: pain management, gooden care, monitor surgical site  Patient Goals: rest, pain control  Family Goals: updates on POC    Progress made toward(s) clinical / shift goals:      Patient's pain managed with scheduled and PRN medications per MAR. Gooden care provided. Output recorded in flowsheets. Surgical site monitored per policy. Patient ambulated in the hallway during shift. Patient slept intermittently throughout shift.       Problem: Knowledge Deficit - Standard  Goal: Patient and family/care givers will demonstrate understanding of plan of care, disease process/condition, diagnostic tests and medications  Outcome: Progressing     Problem: Pain - Standard  Goal: Alleviation of pain or a reduction in pain to the patient’s comfort goal  Outcome: Progressing

## 2025-04-19 NOTE — PROGRESS NOTES
Anival Brooks has been provided discharge instructions, to include follow up care, home medications, and activity/diet reviewed. Copy of discharge instructions in patient chart, signed and reviewed. Patient verbalizes the understanding of the discharge instructions. PIV removed, tip intact, pressure dressing applied. Arm band removed. Patient did not have home meds during admit. Questions and concerns addressed prior to leaving the discharge lounge. Transported via car, accompanied by family. Patient discharge to home.

## 2025-04-19 NOTE — DISCHARGE INSTRUCTIONS
Postoperative Instructions:    Fluid and food:  -Continue to stay very well hydrated.  -You should stick to eating soft foods for the next 7 to 10 days to decrease pain in your mouth and tongue. Examples include soups, smoothies, well-cooked pasta and vegetables, etc. You should avoid things like crackers, pretzels, crusty bread, or anything else with firm edges that may irritate the lining of your cheek.  -After 7-10 days you can start eating a full diet; you'll know when you feel ready.     Drain care:  -Please measure and record the daily out put of your KENNEY drain. We will evaluate this output in the office to determine timing of removal.   -Try to keep the Lopez catheter secured up toward the abdomen. This will require use of extension tubing to be able to use the leg bags. If the sticker falls off, try to replace it in the same location or use some tape on the tubing to keep it directed up on the lower abdomen.     Physical activity:  -Feel free to walk as much as you can.  -Avoid lifting anything greater than 20 pounds, and try to avoid squatting down to pick things off the ground.  -Likewise, avoid straining when having a bowel movement. You can take the prescribed stool softener (Colace) twice daily to keep your stool soft so you don't have to strain to pass the movements.     Follow up:  -We will call you on Monday to arrange a follow up visit next  -If you have any questions or concerns prior to that visit, call us at [phone], or send a [employer] message to Dr. Borden directly.

## 2025-04-19 NOTE — PROGRESS NOTES
Received report from previous shift RN  Assessment complete.  A&O x 4. Patient calls appropriately.  Patient ambulates with stand by assist.   Patient has 8/10 pain. Pain managed with prescribed medications.  Denies N&V. Tolerating full liquid diet.  Surgical scrotal KENNEY, gooden in place.  + void via gooden, + flatus, last BM 4/18 diarrhea per patient  Patient denies SOB.  SCD's refused, patient ambulatory..  Review plan with of care with patient. Call light and personal belongings with in reach. Hourly rounding in place. All needs met at this time.

## 2025-04-22 ENCOUNTER — OFFICE VISIT (OUTPATIENT)
Dept: UROLOGY | Facility: MEDICAL CENTER | Age: 35
End: 2025-04-22
Payer: COMMERCIAL

## 2025-04-22 DIAGNOSIS — G89.18 POSTOPERATIVE PAIN: ICD-10-CM

## 2025-04-22 DIAGNOSIS — N35.914 ANTERIOR URETHRAL STRICTURE: ICD-10-CM

## 2025-04-22 PROCEDURE — 99024 POSTOP FOLLOW-UP VISIT: CPT | Performed by: UROLOGY

## 2025-04-22 RX ORDER — OXYCODONE HYDROCHLORIDE 5 MG/1
5 TABLET ORAL EVERY 6 HOURS PRN
Qty: 20 TABLET | Refills: 0 | Status: SHIPPED | OUTPATIENT
Start: 2025-04-22 | End: 2025-04-27

## 2025-04-22 NOTE — PROGRESS NOTES
Chief Complaint: postoperative visit    HPI: Anival Brooks is a 34 y.o. male with a history of panurethral stricture related to lichen sclerosus, here today for a postoperative visit. He underwent panurethral reconstruction with bilateral buccal and left lingual graft via Sidney technique (perineal approach, penile inversion, unilateral urethral dissection) on April 17th, 2025. He recovered well while hospitalized and was discharged home on April 19th.     Since he's been home, Marvin has been doing well. He has had pain in the perineum requiring both NSAIDS and narcotics, but this is improving.     His KENNEY drain output has been 0 ml for the last two days, and was minimal on Saturday and Sunday    His urine has been clear yellow.    His mouth feels sore, particularly on the right cheek, and he's been eating soft food including mashed potatoes, soup, etc.    Past Medical History:  Past Medical History:   Diagnosis Date    Difficult or painful urination 01/03/2025    Hemorrhoid 01/03/2025    history of    Urinary bladder disorder 03/27/2025    Straight Cath's Every PM       Past Surgical History:  Past Surgical History:   Procedure Laterality Date    MO FULL THICK GRFT HEAD,FAC,HAND <20SQC N/A 4/17/2025    Procedure: ANTERIOR URETHRAL RECONSTRUCTION, HARVEST OF BUCCAL MUCOSA GRAFT FROM BILATERAL CHEEKS, LEFT LINGUAL GRAFT;  Surgeon: Tom Borden M.D.;  Location: SURGERY [facility];  Service: Urology    MO CYSTOURETHROSCOPY N/A 4/17/2025    Procedure: FLEXIBLE CYSTOSCOPY;  Surgeon: Tom Borden M.D.;  Location: SURGERY [facility];  Service: Urology    URETHROGRAM, RETROGRADE, DIAGNOSTIC N/A 01/10/2025    Procedure: RETROGRADE URETHROGRAM, URETHRAL DILATION;  Surgeon: Tom Borden M.D.;  Location: SURGERY [facility];  Service: Urology    OTHER      Ear Tubes       Family History:  Family History   Problem Relation Age of Onset    Diabetes Father     Hypertension Father     Diabetes Sister      Stroke Neg Hx     Heart Disease Neg Hx     Cancer Neg Hx     Lung Disease Neg Hx        Social History:  Social History     Socioeconomic History    Marital status:      Spouse name: Not on file    Number of children: Not on file    Years of education: Not on file    Highest education level: Not on file   Occupational History    Not on file   Tobacco Use    Smoking status: Never     Passive exposure: Never    Smokeless tobacco: Never   Vaping Use    Vaping status: Never Used   Substance and Sexual Activity    Alcohol use: No    Drug use: No    Sexual activity: Yes     Partners: Female   Other Topics Concern    Not on file   Social History Narrative    Not on file     Social Drivers of Health     Financial Resource Strain: Not on file   Food Insecurity: No Food Insecurity (4/17/2025)    Hunger Vital Sign     Worried About Running Out of Food in the Last Year: Never true     Ran Out of Food in the Last Year: Never true   Transportation Needs: No Transportation Needs (4/17/2025)    PRAPARE - Transportation     Lack of Transportation (Medical): No     Lack of Transportation (Non-Medical): No   Physical Activity: Not on file   Stress: Not on file   Social Connections: Not on file   Intimate Partner Violence: Not At Risk (4/17/2025)    Humiliation, Afraid, Rape, and Kick questionnaire     Fear of Current or Ex-Partner: No     Emotionally Abused: No     Physically Abused: No     Sexually Abused: No   Housing Stability: Low Risk  (4/17/2025)    Housing Stability Vital Sign     Unable to Pay for Housing in the Last Year: No     Number of Times Moved in the Last Year: 0     Homeless in the Last Year: No       Medications:  Current Outpatient Medications   Medication Sig Dispense Refill    chlorhexidine (PERIDEX) 0.12 % Solution Take 30 mL by mouth 4 times a day for 14 days. For oral surgery care. Swish and Spit; do not swallow. 1680 mL 0    acetaminophen (TYLENOL) 325 MG Tab Take 2 Tablets by mouth every 6 hours as  needed for Mild Pain. 30 Tablet 0    oxyCODONE immediate-release (ROXICODONE) 5 MG Tab Take 1 Tablet by mouth every 6 hours as needed for Severe Pain (For postoperative pain that has not responded to acetaminophen and celecoxib) for up to 5 days. 20 Tablet 0    celecoxib (CELEBREX) 200 MG Cap Take 1 Capsule by mouth 2 times a day as needed for Moderate Pain. 60 Capsule 1    docusate sodium (COLACE) 100 MG Cap Take 1 Capsule by mouth 2 times a day. 60 Capsule 0    Multiple Vitamin (MULTIVITAMIN PO) Take 1 Packet by mouth every day. MVI Packet       No current facility-administered medications for this visit.       Allergies:  No Known Allergies    Review of Systems:  Constitutional: Negative for fever, chills and malaise/fatigue.   HENT: Negative for congestion.    Eyes: Negative for pain.   Respiratory: Negative for cough and shortness of breath.    Cardiovascular: Negative for leg swelling.   Gastrointestinal: Negative for nausea, vomiting, abdominal pain and diarrhea.   Genitourinary: Negative for dysuria and hematuria.   Skin: Negative for rash.   Neurological: Negative for dizziness, focal weakness and headaches.   Endo/Heme/Allergies: Does not bruise/bleed easily.   Psychiatric/Behavioral: Negative for depression.  The patient is not nervous/anxious.        Physical Exam:  There were no vitals filed for this visit.    GENERAL: well appearing, well nourished, NAD  RESP: respiratory effort normal  ABDOMEN: soft, nontender, nondistended, no masses or organomegaly  HERNIAS: no hernias found on exam  SKIN/LYMPH: normal coloration and turgor, no suspicious skin lesions noted  NEURO/PSYCH: alert, oriented, normal speech, no focal findings or movement disorder noted  EXTREMITIES: no pedal edema noted  GENITOURINARY:  Penis and scrotum normal; oral mucosa at urethral meatus appears healthy. Catheter is draining clear yellow urine. KENNEY drain bulb is empty. Perineal wound is c/d/I, with no ecchymoses in the penis or  perineum.       Data Review:    Labs:  POCT UA   Lab Results   Component Value Date/Time    POCCOLOR DARK YELLOW 12/11/2024 09:43 AM    POCAPPEAR SLIGHTLY CLOUDY 12/11/2024 09:43 AM    POCLEUKEST TRACE 12/11/2024 09:43 AM    POCNITRITE POSITIVE 12/11/2024 09:43 AM    POCUROBILIGE 0.2 12/11/2024 09:43 AM    POCPROTEIN NEG 12/11/2024 09:43 AM    POCURPH 6.5 12/11/2024 09:43 AM    POCBLOOD NEG 12/11/2024 09:43 AM    POCSPGRV 1.020 12/11/2024 09:43 AM    POCKETONES NEG 12/11/2024 09:43 AM    POCBILIRUBIN NEG 12/11/2024 09:43 AM    POCGLUCUA NEG 12/11/2024 09:43 AM      CBC   Lab Results   Component Value Date/Time    WBC 14.5 (H) 04/18/2025 0142    RBC 4.18 (L) 04/18/2025 0142    HEMOGLOBIN 12.5 (L) 04/18/2025 0142    HEMATOCRIT 37.7 (L) 04/18/2025 0142    MCV 90.2 04/18/2025 0142    MCH 29.9 04/18/2025 0142    MCHC 33.2 04/18/2025 0142    RDW 48.0 04/18/2025 0142    MPV 9.9 04/18/2025 0142    LYMPHOCYTES 33.00 04/10/2025 1529    LYMPHS 3.25 04/10/2025 1529    MONOCYTES 4.80 04/10/2025 1529    MONOS 0.47 04/10/2025 1529    EOSINOPHILS 1.60 04/10/2025 1529    EOS 0.16 04/10/2025 1529    BASOPHILS 0.60 04/10/2025 1529    BASO 0.06 04/10/2025 1529    NRBC 0.00 04/10/2025 1529       CMP   Lab Results   Component Value Date/Time    SODIUM 138 04/18/2025 0142    POTASSIUM 4.6 04/18/2025 0142    CHLORIDE 105 04/18/2025 0142    CO2 22 04/18/2025 0142    ANION 11.0 04/18/2025 0142    GLUCOSE 154 (H) 04/18/2025 0142    BUN 11 04/18/2025 0142    CREATININE 0.78 04/18/2025 0142    GFRCKD 118 04/18/2025 0142    CALCIUM 8.3 (L) 04/18/2025 0142    ASTSGOT 19 05/10/2012 0209    ALTSGPT 32 05/10/2012 0209    ALKPHOSPHAT 58 05/10/2012 0209    TBILIRUBIN 0.4 05/10/2012 0209    ALBUMIN 3.5 05/10/2012 0209    TOTPROTEIN 6.0 05/10/2012 0209    GLOBULIN 2.5 05/10/2012 0209    AGRATIO 1.4 05/10/2012 0209       Assessment: 34 y.o.male with a history of LS-related panurethral stricture, now POD5 s/p panurethral reconstruction from meatus to  bulbomembranous junction with dorsal onlay of buccal and lingual graft. Doing very well.       Plan:    -Continue peridex mouthwash, swish and spit, every 6 hours for a total of two weeks  -Maintain Lopez catheter; will remove at 4 weeks postop  -Avoid heavy lifting and straining  -Can advance diet as tolerated    Tom Borden MD

## 2025-04-23 ENCOUNTER — TELEPHONE (OUTPATIENT)
Dept: UROLOGY | Facility: MEDICAL CENTER | Age: 35
End: 2025-04-23
Payer: COMMERCIAL

## 2025-04-23 NOTE — TELEPHONE ENCOUNTER
VOICEMAIL  1. Caller Name: Marvin                      Call Back Number: [phone]    2. Message: Patient called and stated that he asked for a school note to have his son's absences dismissed as he stayed with patient for his surgery. Son's name is Oziel Brooks and he missed 4/16-4/18. Please advise.      3. Patient approves office to leave a detailed voicemail/MyChart message: yes

## 2025-04-23 NOTE — LETTER
April 25, 2025         To Whom it May Concern:    Please excuse Oziel Brooks from being absent from school on 4/16/25-4/18/25. Oziel's father was in the hospital for a surgery and was home with him. He may return to school on 4/21/2025.    If you have any questions or concerns, please don't hesitate to call.        Sincerely,     Tom oBrden M.D.  Electronically Signed

## 2025-05-04 DIAGNOSIS — G89.18 POSTOPERATIVE PAIN: ICD-10-CM

## 2025-05-04 DIAGNOSIS — N35.914 ANTERIOR URETHRAL STRICTURE: ICD-10-CM

## 2025-05-06 RX ORDER — OXYCODONE HYDROCHLORIDE 5 MG/1
5 TABLET ORAL EVERY 6 HOURS PRN
Qty: 20 TABLET | Refills: 0 | Status: SHIPPED | OUTPATIENT
Start: 2025-05-06 | End: 2025-05-11

## 2025-05-13 ENCOUNTER — OFFICE VISIT (OUTPATIENT)
Dept: UROLOGY | Facility: MEDICAL CENTER | Age: 35
End: 2025-05-13
Payer: COMMERCIAL

## 2025-05-13 DIAGNOSIS — N35.914 ANTERIOR URETHRAL STRICTURE: ICD-10-CM

## 2025-05-13 LAB
POC POST-VOID: 59 ML
POC PRE-VOID: NORMAL

## 2025-05-13 PROCEDURE — 99024 POSTOP FOLLOW-UP VISIT: CPT | Performed by: UROLOGY

## 2025-05-13 PROCEDURE — 51798 US URINE CAPACITY MEASURE: CPT | Performed by: UROLOGY

## 2025-05-13 NOTE — PROGRESS NOTES
Chief Complaint: postop exam, catheter removal    HPI: Anival Brooks is a 34 y.o. 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     He feels today; no residual pain. Mouth has healed well and eating a normal diet. His main ongoing symptom has been pain with erections with the catheter in place.     Past Medical History:  Past Medical History:   Diagnosis Date    Difficult or painful urination 01/03/2025    Hemorrhoid 01/03/2025    history of    Urinary bladder disorder 03/27/2025    Straight Cath's Every PM       Past Surgical History:  Past Surgical History:   Procedure Laterality Date    SC FULL THICK GRFT HEAD,FAC,HAND <20SQC N/A 4/17/2025    Procedure: ANTERIOR URETHRAL RECONSTRUCTION, HARVEST OF BUCCAL MUCOSA GRAFT FROM BILATERAL CHEEKS, LEFT LINGUAL GRAFT;  Surgeon: Tom Borden M.D.;  Location: SURGERY [facility];  Service: Urology    SC CYSTOURETHROSCOPY N/A 4/17/2025    Procedure: FLEXIBLE CYSTOSCOPY;  Surgeon: Tom Borden M.D.;  Location: SURGERY [facility];  Service: Urology    URETHROGRAM, RETROGRADE, DIAGNOSTIC N/A 01/10/2025    Procedure: RETROGRADE URETHROGRAM, URETHRAL DILATION;  Surgeon: Tom Borden M.D.;  Location: SURGERY [facility];  Service: Urology    OTHER      Ear Tubes       Family History:  Family History   Problem Relation Age of Onset    Diabetes Father     Hypertension Father     Diabetes Sister     Stroke Neg Hx     Heart Disease Neg Hx     Cancer Neg Hx     Lung Disease Neg Hx        Social History:  Social History     Socioeconomic History    Marital status:      Spouse name: Not on file    Number of children: Not on file    Years of education: Not on file    Highest education level: Not on file   Occupational History    Not on file   Tobacco Use    Smoking status: Never      Passive exposure: Never    Smokeless tobacco: Never   Vaping Use    Vaping status: Never Used   Substance and Sexual Activity    Alcohol use: No    Drug use: No    Sexual activity: Yes     Partners: Female   Other Topics Concern    Not on file   Social History Narrative    Not on file     Social Drivers of Health     Financial Resource Strain: Not on file   Food Insecurity: No Food Insecurity (4/17/2025)    Hunger Vital Sign     Worried About Running Out of Food in the Last Year: Never true     Ran Out of Food in the Last Year: Never true   Transportation Needs: No Transportation Needs (4/17/2025)    PRAPARE - Transportation     Lack of Transportation (Medical): No     Lack of Transportation (Non-Medical): No   Physical Activity: Not on file   Stress: Not on file   Social Connections: Not on file   Intimate Partner Violence: Not At Risk (4/17/2025)    Humiliation, Afraid, Rape, and Kick questionnaire     Fear of Current or Ex-Partner: No     Emotionally Abused: No     Physically Abused: No     Sexually Abused: No   Housing Stability: Low Risk  (4/17/2025)    Housing Stability Vital Sign     Unable to Pay for Housing in the Last Year: No     Number of Times Moved in the Last Year: 0     Homeless in the Last Year: No       Medications:  Current Outpatient Medications   Medication Sig Dispense Refill    acetaminophen (TYLENOL) 325 MG Tab Take 2 Tablets by mouth every 6 hours as needed for Mild Pain. 30 Tablet 0    celecoxib (CELEBREX) 200 MG Cap Take 1 Capsule by mouth 2 times a day as needed for Moderate Pain. 60 Capsule 1    docusate sodium (COLACE) 100 MG Cap Take 1 Capsule by mouth 2 times a day. 60 Capsule 0    Multiple Vitamin (MULTIVITAMIN PO) Take 1 Packet by mouth every day. MVI Packet       No current facility-administered medications for this visit.       Allergies:  No Known Allergies    Review of Systems:  Constitutional: Negative for fever, chills and malaise/fatigue.   HENT: Negative for congestion.     Eyes: Negative for pain.   Respiratory: Negative for cough and shortness of breath.    Cardiovascular: Negative for leg swelling.   Gastrointestinal: Negative for nausea, vomiting, abdominal pain and diarrhea.   Genitourinary: Negative for dysuria and hematuria.   Skin: Negative for rash.   Neurological: Negative for dizziness, focal weakness and headaches.   Endo/Heme/Allergies: Does not bruise/bleed easily.   Psychiatric/Behavioral: Negative for depression.  The patient is not nervous/anxious.        Physical Exam:  There were no vitals filed for this visit.    GENERAL: well appearing, well nourished, NAD  RESP: respiratory effort normal  ENT: Mouth well healed at all 3 graft harvest sites  ABDOMEN: soft, nontender, nondistended, no masses or organomegaly  HERNIAS: no hernias found on exam  SKIN/LYMPH: normal coloration and turgor, no suspicious skin lesions noted  NEURO/PSYCH: alert, oriented, normal speech, no focal findings or movement disorder noted  EXTREMITIES: no pedal edema noted  GENITOURINARY:  Penis circumcised and normal vascularity; meatus is wide open, with some degree of traumatic epispadias from catheter, with the graft proximal to this area remaining healthy in appearance. Perineal wound c/d/I; he had some superficial skin dehiscence at the anterior portion of the wound but this is healing well be secondary intention and the Colles remained closed  RECTAL: Exam Deferred    The patient's bladder was filled with saline until he had a strong urge to void. The catheter was removed atraumatically and he voided well without straining.     PVR: 57 mL  (bladder scanner)     Data Review:    Labs:  POCT UA   Lab Results   Component Value Date/Time    POCCOLOR DARK YELLOW 12/11/2024 09:43 AM    POCAPPEAR SLIGHTLY CLOUDY 12/11/2024 09:43 AM    POCLEUKEST TRACE 12/11/2024 09:43 AM    POCNITRITE POSITIVE 12/11/2024 09:43 AM    POCUROBILIGE 0.2 12/11/2024 09:43 AM    POCPROTEIN NEG 12/11/2024 09:43 AM     POCURPH 6.5 12/11/2024 09:43 AM    POCBLOOD NEG 12/11/2024 09:43 AM    POCSPGRV 1.020 12/11/2024 09:43 AM    POCKETONES NEG 12/11/2024 09:43 AM    POCBILIRUBIN NEG 12/11/2024 09:43 AM    POCGLUCUA NEG 12/11/2024 09:43 AM      CBC   Lab Results   Component Value Date/Time    WBC 14.5 (H) 04/18/2025 0142    RBC 4.18 (L) 04/18/2025 0142    HEMOGLOBIN 12.5 (L) 04/18/2025 0142    HEMATOCRIT 37.7 (L) 04/18/2025 0142    MCV 90.2 04/18/2025 0142    MCH 29.9 04/18/2025 0142    MCHC 33.2 04/18/2025 0142    RDW 48.0 04/18/2025 0142    MPV 9.9 04/18/2025 0142    LYMPHOCYTES 33.00 04/10/2025 1529    LYMPHS 3.25 04/10/2025 1529    MONOCYTES 4.80 04/10/2025 1529    MONOS 0.47 04/10/2025 1529    EOSINOPHILS 1.60 04/10/2025 1529    EOS 0.16 04/10/2025 1529    BASOPHILS 0.60 04/10/2025 1529    BASO 0.06 04/10/2025 1529    NRBC 0.00 04/10/2025 1529       CMP   Lab Results   Component Value Date/Time    SODIUM 138 04/18/2025 0142    POTASSIUM 4.6 04/18/2025 0142    CHLORIDE 105 04/18/2025 0142    CO2 22 04/18/2025 0142    ANION 11.0 04/18/2025 0142    GLUCOSE 154 (H) 04/18/2025 0142    BUN 11 04/18/2025 0142    CREATININE 0.78 04/18/2025 0142    GFRCKD 118 04/18/2025 0142    CALCIUM 8.3 (L) 04/18/2025 0142    ASTSGOT 19 05/10/2012 0209    ALTSGPT 32 05/10/2012 0209    ALKPHOSPHAT 58 05/10/2012 0209    TBILIRUBIN 0.4 05/10/2012 0209    ALBUMIN 3.5 05/10/2012 0209    TOTPROTEIN 6.0 05/10/2012 0209    GLOBULIN 2.5 05/10/2012 0209    AGRATIO 1.4 05/10/2012 0209     Assessment: 34 y.o.male with a history of panurethral stricture, now 1 month status post panurethral reconstruction with bilateral buccal and left lingual graft via Sidney style penile inversion urethroplasty. Doing very well, and catheter removed today.       Plan:    -Refrain from sexual activity for another 2 weeks (6 weeks postop  -Ok to resume other physical activity  -Follow up in 2 months for repeat PVR and physical exam; if epispadias is problematic we can discuss  meatal revision     Tom Borden MD

## 2025-05-15 ENCOUNTER — APPOINTMENT (OUTPATIENT)
Dept: UROLOGY | Facility: MEDICAL CENTER | Age: 35
End: 2025-05-15
Payer: COMMERCIAL

## 2025-06-02 ENCOUNTER — TELEPHONE (OUTPATIENT)
Dept: UROLOGY | Facility: MEDICAL CENTER | Age: 35
End: 2025-06-02
Payer: COMMERCIAL

## 2025-06-02 DIAGNOSIS — N50.82 SCROTAL PAIN: Primary | ICD-10-CM

## 2025-06-02 NOTE — TELEPHONE ENCOUNTER
VOICEMAIL  1. Caller Name: Anival Brooks                      Call Back Number: [phone]    2. Message: pt called and lm stating he accidentally left his medication Celecoxib in [facility] over Memorial day weekend and is requesting a refill    3. Patient approves office to leave a detailed voicemail/MyChart message: yes

## 2025-06-05 RX ORDER — CELECOXIB 200 MG/1
200 CAPSULE ORAL 2 TIMES DAILY PRN
Qty: 60 CAPSULE | Refills: 1 | Status: SHIPPED | OUTPATIENT
Start: 2025-06-05

## 2025-06-05 NOTE — TELEPHONE ENCOUNTER
Called and spoke with Marvin; doing well overall but having intermittent scrotal pain that resolves with celebrex, and he left his bottle of pills in [place] on a recent trip. Refill sent to his [facility] pharmacy now.

## (undated) DEVICE — LEGGING LITHOTOMY 31 X 48 IN - (2EA/PK 20PK/CA)

## (undated) DEVICE — SET LEADWIRE 5 LEAD BEDSIDE DISPOSABLE ECG (1SET OF 5/EA)

## (undated) DEVICE — PEN SKIN MARKER W/RULER - (50EA/BX)

## (undated) DEVICE — SYRINGE 30 ML LL (56/BX)

## (undated) DEVICE — SUTURE 5-0 PDS RB-1 (36PK/BX)

## (undated) DEVICE — SUCTION INSTRUMENT YANKAUER BULBOUS TIP W/O VENT (50EA/CA)

## (undated) DEVICE — SUTURE 4-0 PDS II RB-1 (36EA/BX)

## (undated) DEVICE — DRAPE LARGE 3 QUARTER - (20/CA)

## (undated) DEVICE — SUTURE 4-0 VICRYL PLUS RB-1 - 27 INCH (36/BX)

## (undated) DEVICE — TOWELS CLOTH SURGICAL - (4/PK 20PK/CA)

## (undated) DEVICE — SENSOR OXIMETER ADULT SPO2 RD SET (20EA/BX)

## (undated) DEVICE — PAD GROUNDING BOVIE PEDS - (25/CA)

## (undated) DEVICE — CIRCUIT VENTILATOR PEDIATRIC WITH FILTER (20EA/CS)

## (undated) DEVICE — COVER LIGHT HANDLE ALC PLUS DISP (18EA/BX)

## (undated) DEVICE — RINGDISP RETRACTOR LONESTAR

## (undated) DEVICE — DRAPE UNDER BUTTOCK LRG (40/CA)

## (undated) DEVICE — Device

## (undated) DEVICE — SLEEVE VASO DVT COMPRESSION CALF MED - (10PR/CA)

## (undated) DEVICE — SHEET PEDIATRIC LAPAROTOMY - (10/CA)

## (undated) DEVICE — GLOVE BIOGEL PI INDICATOR SZ 6.0 SURGICAL PF LF -(200PR/CA)

## (undated) DEVICE — SUTURE 3-0 VICRYL PLUS SH - 27 INCH (36/BX)

## (undated) DEVICE — COVER LIGHT HANDLE FLEXIBLE - SOFT (2EA/PK 80PK/CA)

## (undated) DEVICE — PAD LAP STERILE 18 X 18 - (5/PK 40PK/CA)

## (undated) DEVICE — SYRINGE 10 ML CONTROL LL (25EA/BX 4BX/CA)

## (undated) DEVICE — COVER MAYO STAND X-LG - (22EA/CA)

## (undated) DEVICE — GLOVE SZ 6 BIOGEL PI MICRO - PF LF (50PR/BX 4BX/CA)

## (undated) DEVICE — STAPLER SKIN DISP - (6/BX 10BX/CA) VISISTAT

## (undated) DEVICE — SLEEVE, VASO, THIGH, MED

## (undated) DEVICE — WIRE GUIDE SENSOR DUAL FLEX - 5/BX

## (undated) DEVICE — PACK CYSTO III (2EA/CA)

## (undated) DEVICE — TUBE SUCTION YANKAUER 1/4 X 6FT (50EA/CA)"

## (undated) DEVICE — STAY ELASTIC BLUNT RETRACTOR 12MM (8EA/PK)

## (undated) DEVICE — PACK MINOR BASIN - (2EA/CA)

## (undated) DEVICE — GLOVE SZ 7.5 BIOGEL PI MICRO - PF LF (50PR/BX)

## (undated) DEVICE — COVER FOOT UNIVERSAL DISP. - (25EA/CA)

## (undated) DEVICE — GLOVE BIOGEL PI INDICATOR SZ 8.0 SURGICAL PF LF -(50/BX 4BX/CA)

## (undated) DEVICE — CANISTER SUCTION 3000ML MECHANICAL FILTER AUTO SHUTOFF MEDI-VAC NONSTERILE LF DISP (40EA/CA)

## (undated) DEVICE — PUMP SINGLE ACTION 5/BX

## (undated) DEVICE — SODIUM CHL. IRRIGATION 0.9% 3000ML (4EA/CA 65CA/PF)

## (undated) DEVICE — BLADE SURGICAL #15 - (50/BX 3BX/CA)

## (undated) DEVICE — ADHESIVE MASTISOL - (48/BX)

## (undated) DEVICE — CATHETER IV SAFETY 14 GA X 2 IN (200/CA)

## (undated) DEVICE — SPONGE GAUZESTER 4 X 4 4PLY - (128PK/CA)

## (undated) DEVICE — GLOVE BIOGEL SZ 8 SURGICAL PF LTX - (50PR/BX 4BX/CA)

## (undated) DEVICE — PACK MINOR BASIN - (4EA/CA)

## (undated) DEVICE — SET EXTENSION WITH 2 PORTS (48EA/CA) ***PART #2C8610 IS A SUBSTITUTE*****

## (undated) DEVICE — CONTAINER SPECIMEN BAG OR - STERILE 4 OZ W/LID (100EA/CA)

## (undated) DEVICE — CONNECTOR HOSE NEPTUNE FOR CYSTO ROOM

## (undated) DEVICE — LACTATED RINGERS INJ 1000 ML - (14EA/CA 60CA/PF)

## (undated) DEVICE — SUTURE 2-0 ETHILON FS - (36/BX) 18 INCH

## (undated) DEVICE — SET IRRIGATION CYSTOSCOPY TUBE L80 IN (20EA/CA)

## (undated) DEVICE — GOWN SURGEONS X-LARGE - DISP. (30/CA)

## (undated) DEVICE — SUTURE CHROMIC GUT ETHILON MULTIPASS 5-0 PS-5 18IN MONOFILAMENT ABSORBABLE (12/BX)

## (undated) DEVICE — SUTURE 2-0 SILK SH (36PK/BX)

## (undated) DEVICE — ELECTRODE DUAL RETURN W/ CORD - (50/PK)

## (undated) DEVICE — CLOSURE SKIN STRIP 1/2 X 4 IN - (STERI STRIP) (50/BX 4BX/CA)

## (undated) DEVICE — TUBING CLEARLINK DUO-VENT - C-FLO (48EA/CA)

## (undated) DEVICE — NEEDLE NON SAFETY 25 GA X 1 1/2 IN HYPO (100EA/BX)

## (undated) DEVICE — SYRINGE ASEPTO - (50EA/CA

## (undated) DEVICE — BAG DRAINAGE LINGEMAN CYSTO FOR GE/OEC 2600/2800 TABLES (20EA/CA) USE #28358

## (undated) DEVICE — SPONGE XRAY 8X4 STERL. 12PL - (10EA/TY 80TY/CA)

## (undated) DEVICE — BAG DRAINAGE URINARY CLOSED 2000ML (20EA/CA)

## (undated) DEVICE — GLOVE BIOGEL PI INDICATOR SZ 7.5 SURGICAL PF LF -(50/BX 4BX/CA)

## (undated) DEVICE — WATER IRRIG. STER 3000 ML - (4/CA)

## (undated) DEVICE — BOVIE BLADE COATED - (50/PK)

## (undated) DEVICE — LACTATED RINGERS INJ. 500 ML - (24EA/CA)

## (undated) DEVICE — SUTURE GENERAL

## (undated) DEVICE — WATER IRRIGATION STERILE 1000ML (12EA/CA)

## (undated) DEVICE — GOWN WARMING STANDARD FLEX - (30/CA)

## (undated) DEVICE — ATHLETIC SUPPORTER LARGE - (1/EA)

## (undated) DEVICE — CORDS BIPOLAR COAGULATION - 12FT STERILE DISP. (10EA/BX)

## (undated) DEVICE — TRANSDUCER OXISENSOR PEDS O2 - (20EA/BX)

## (undated) DEVICE — BLADE SURGICAL #11 - (50/BX)

## (undated) DEVICE — DRAIN JACKSON PRATT 15FR - (10EA/CA)

## (undated) DEVICE — JELLY SURGILUBE STERILE TUBE 4.25 OZ (1/EA)

## (undated) DEVICE — GLOVE BIOGEL INDICATOR SZ 8 SURGICAL PF LTX - (50/BX 4BX/CA)

## (undated) DEVICE — CATHETER URETHRAL FOLEY SILICONE OD14 FR 10 ML (10EA/CA)

## (undated) DEVICE — TUBE CONNECT SUCTION CLEAR 120 X 1/4" (50EA/CA)"

## (undated) DEVICE — CHLORAPREP 26 ML APPLICATOR - ORANGE TINT(25/CA)

## (undated) DEVICE — MICRODRIP PRIMARY VENTED 60 (48EA/CA) THIS WAS PART #2C8428 WHICH WAS DISCONTINUED

## (undated) DEVICE — SUTURE 3-0 SILK SH 30 (36PK/BX)

## (undated) DEVICE — RESERVOIR SUCTION 100 CC - SILICONE (20EA/CA)

## (undated) DEVICE — BOVIE BLADE COATED &INSULATED - 25/PK

## (undated) DEVICE — SODIUM CHL IRRIGATION 0.9% 1000ML (12EA/CA)

## (undated) DEVICE — RING SUCTION WITH TUBING (20EA/PK)

## (undated) DEVICE — DRAPE STRLE REG TOWEL 18X24 - (10/BX 4BX/CA)"

## (undated) DEVICE — SUTURE 4-0 CHROMIC RB-1 27 (36PK/BX)"

## (undated) DEVICE — CATHETER BALLOON UROMAX 6FR X 10CM